# Patient Record
Sex: FEMALE | Race: BLACK OR AFRICAN AMERICAN | Employment: FULL TIME | URBAN - METROPOLITAN AREA
[De-identification: names, ages, dates, MRNs, and addresses within clinical notes are randomized per-mention and may not be internally consistent; named-entity substitution may affect disease eponyms.]

---

## 2018-01-26 ENCOUNTER — HOSPITAL ENCOUNTER (EMERGENCY)
Age: 53
Discharge: HOME | End: 2018-01-26
Attending: FAMILY MEDICINE

## 2018-01-26 VITALS
RESPIRATION RATE: 16 BRPM | HEART RATE: 85 BPM | HEIGHT: 72 IN | TEMPERATURE: 98.5 F | WEIGHT: 254 LBS | SYSTOLIC BLOOD PRESSURE: 186 MMHG | DIASTOLIC BLOOD PRESSURE: 99 MMHG | OXYGEN SATURATION: 96 % | BODY MASS INDEX: 34.4 KG/M2

## 2018-01-26 DIAGNOSIS — J10.1 INFLUENZA A: Primary | ICD-10-CM

## 2018-01-26 LAB
INFLUENZA A AG (POC): POSITIVE
INFLUENZA AG (POC) NEGATIVE CTRL.: ABNORMAL
INFLUENZA AG (POC) POSITIVE CTRL.: ABNORMAL
INFLUENZA B AG (POC): NEGATIVE
LOT NUMBER POC: ABNORMAL

## 2018-01-26 RX ORDER — BENZONATATE 200 MG/1
200 CAPSULE ORAL
Qty: 30 CAP | Refills: 0 | Status: SHIPPED | OUTPATIENT
Start: 2018-01-26 | End: 2018-04-06

## 2018-01-26 RX ORDER — OSELTAMIVIR PHOSPHATE 75 MG/1
75 CAPSULE ORAL 2 TIMES DAILY
Qty: 10 CAP | Refills: 0 | Status: SHIPPED | OUTPATIENT
Start: 2018-01-26 | End: 2018-01-31

## 2018-01-26 NOTE — UC PROVIDER NOTE
Patient is a 46 y.o. female presenting with cold symptoms. The history is provided by the patient. Cold Symptoms    This is a new problem. The current episode started 2 days ago. The problem has been gradually worsening. Patient reports a subjective fever - was not measured. The fever has been present for less than 1 day. Associated symptoms include congestion, headaches, rhinorrhea, sinus pain, sneezing, swollen glands and cough. Pertinent negatives include no chest pain, no diarrhea, no nausea, no vomiting, no sore throat and no wheezing. She has tried increased fluids for the symptoms. The treatment provided no relief. Past Medical History:   Diagnosis Date    Follicular adenoma of thyroid gland 12/5/2012    Hypertension     Migraine         Past Surgical History:   Procedure Laterality Date    HX GYN      hysterectomy    HX TUMOR REMOVAL           Family History   Problem Relation Age of Onset    Family history unknown: Yes        Social History     Social History    Marital status: SINGLE     Spouse name: N/A    Number of children: N/A    Years of education: N/A     Occupational History    Not on file. Social History Main Topics    Smoking status: Current Every Day Smoker     Packs/day: 0.25     Types: Cigarettes    Smokeless tobacco: Never Used    Alcohol use No    Drug use: No    Sexual activity: No     Other Topics Concern    Not on file     Social History Narrative                ALLERGIES: Latex    Review of Systems   Constitutional: Positive for chills and fever. HENT: Positive for congestion, rhinorrhea, sinus pain and sneezing. Negative for sore throat. Respiratory: Positive for cough. Negative for shortness of breath and wheezing. Cardiovascular: Negative for chest pain and palpitations. Gastrointestinal: Negative for diarrhea, nausea and vomiting. Neurological: Positive for headaches.        Vitals:    01/26/18 1754   BP: (!) 186/99   Pulse: 85   Resp: 16   Temp: 98.5 °F (36.9 °C)   SpO2: 96%   Weight: 115.2 kg (254 lb)   Height: 6' 1\" (1.854 m)       Physical Exam   Constitutional: She appears well-developed and well-nourished. No distress. HENT:   Right Ear: Tympanic membrane, external ear and ear canal normal.   Left Ear: Tympanic membrane, external ear and ear canal normal.   Nose: Rhinorrhea present. Mouth/Throat: Mucous membranes are normal. Posterior oropharyngeal erythema present. No oropharyngeal exudate, posterior oropharyngeal edema or tonsillar abscesses. Cardiovascular: Normal rate, regular rhythm and normal heart sounds. Pulmonary/Chest: Effort normal and breath sounds normal. No respiratory distress. She has no wheezes. She has no rales. Lymphadenopathy:     She has cervical adenopathy. Neurological: She is alert. Skin: She is not diaphoretic. Psychiatric: She has a normal mood and affect. Her behavior is normal. Judgment and thought content normal.   Nursing note and vitals reviewed. MDM     Differential Diagnosis; Clinical Impression; Plan:     CLINICAL IMPRESSION:  Influenza A  (primary encounter diagnosis)    Plan:  1. Tamiflu  2. Fluids, tylenol  3. RTC INI  Amount and/or Complexity of Data Reviewed:   Clinical lab tests:  Ordered and reviewed  Risk of Significant Complications, Morbidity, and/or Mortality:   Presenting problems: Moderate  Diagnostic procedures: Moderate  Management options:   Moderate  Progress:   Patient progress:  Stable      Procedures

## 2018-01-26 NOTE — DISCHARGE INSTRUCTIONS

## 2018-01-26 NOTE — LETTER
[facility] 
[address] [place] [address] 
[phone] Work/School Note Date: 1/26/2018 To Whom It May concern: 
 
Nahed Lam was seen and treated today in the urgent care center by the following provider(s): 
Attending Provider: Ophelia Sinclair MD.   
 
Nahed Lam may return to work on 1/29/2018. Sincerely, Ophelia Sinclair MD

## 2018-04-06 ENCOUNTER — OFFICE VISIT (OUTPATIENT)
Dept: URGENT CARE | Age: 53
End: 2018-04-06

## 2018-04-06 VITALS
BODY MASS INDEX: 34.4 KG/M2 | HEIGHT: 72 IN | TEMPERATURE: 97.9 F | OXYGEN SATURATION: 99 % | HEART RATE: 82 BPM | DIASTOLIC BLOOD PRESSURE: 109 MMHG | RESPIRATION RATE: 16 BRPM | WEIGHT: 254 LBS | SYSTOLIC BLOOD PRESSURE: 209 MMHG

## 2018-04-06 DIAGNOSIS — S39.011A STRAIN OF FLANK, INITIAL ENCOUNTER: Primary | ICD-10-CM

## 2018-04-06 DIAGNOSIS — M62.838 MUSCLE SPASM: ICD-10-CM

## 2018-04-06 DIAGNOSIS — R03.0 ELEVATED BLOOD PRESSURE READING: ICD-10-CM

## 2018-04-06 RX ORDER — METHOCARBAMOL 500 MG/1
500 TABLET, FILM COATED ORAL 3 TIMES DAILY
Qty: 12 TAB | Refills: 0 | Status: SHIPPED | OUTPATIENT
Start: 2018-04-06 | End: 2018-04-10 | Stop reason: SINTOL

## 2018-04-06 NOTE — PATIENT INSTRUCTIONS
Follow up with PCP in 7 days if you dont feel improved. You should follow up within the next 2-3 weeks to discuss BP. Monitor, write down values and bring to appointment. Use heat compresses to area. Maintain normal activities. Avoid strenuous bending, lifting or twisting for next 4-5 days. Back Spasm: Care Instructions  Your Care Instructions  A back spasm is sudden tightness and pain in your back muscles. It may happen from overuse or an injury. Things like sleeping in an awkward way, bending, lifting, standing, or sitting can sometimes cause a spasm. But the cause isn't always clear. Home treatment includes using heat or ice, taking over-the-counter (OTC) pain medicines, and avoiding activities that may cause back pain. For a back spasm that doesn't get better with home care, your doctor may prescribe medicine. Treatments such as massage or manipulation may also help ease a back spasm. Your doctor may also suggest exercise or physical therapy to help improve strength and flexibility in your back muscles. In most cases, getting back to your normal activities is good foryour back. Just make sure to avoid doing things that make your pain worse. Follow-up care is a key part of your treatment and safety. Be sure to make and go to all appointments, and call your doctor if you are having problems. It's also a good idea to know your test results and keep a list of the medicines you take. How can you care for yourself at home? ? Heat, ice, and medicines  ? · To relieve pain, use heat or ice (whichever feels better) on the affected area. ¨ Put a warm water bottle, a heating pad set on low, or a warm cloth on your back. Put a thin cloth between the heating pad and your skin. Do not go to sleep with a heating pad on your skin. ¨ Try ice or a cold pack on the area for 10 to 20 minutes at a time. Put a thin cloth between the ice and your skin.    ? · Ask your doctor if you can take acetaminophen (such as Tylenol) or nonsteroidal anti-inflammatory drugs, such as ibuprofen or naproxen. Your doctor can prescribe stronger medicines if needed. Be safe with medicines. Read and follow all instructions on the label. ?Body positions and posture  ? · Sit or lie in positions that are most comfortable for you and that reduce pain. Try one of these positions when you lie down:  ¨ Lie on your back with your knees bent and supported by large pillows. ¨ Lie on the floor with your legs on the seat of a sofa or chair. Evi [place] on your side with your knees and hips bent and a pillow between your legs. ¨ Lie on your stomach if it does not make pain worse. ? · Do not sit up in bed. Avoid soft couches and twisted positions. ? · Avoid bed rest after the first day of back pain. Bed rest can help relieve pain at first, but it delays healing. Continued rest without activity is usually not good for your back. ? · If you must sit for long periods of time, take breaks from sitting. Change positions every 30 minutes. Get up and walk around, or lie in a comfortable position. Activity  ? · Take short walks several times a day. You can start with 5 to 10 minutes, 3 or 4 times a day, and work up to longer walks. Walk on level surfaces and avoid hills and stairs until your back starts to feel better. ? · After your back spasm starts to feel better, try to stretch your muscles every day, especially before and after exercise and at bedtime. Regular stretching can help relax your muscles. ? · To prevent future back pain, do exercises to stretch and strengthen your back and stomach. Learn to use good posture, safe lifting techniques, and other ways to move to help you avoid back pain. When should you call for help? Call 911 anytime you think you may need emergency care. For example, call if:  ? · You are unable to move an arm or a leg at all.    ?Call your doctor now or seek immediate medical care if:  ? · You have new or worse symptoms in your legs, belly, or buttocks. Symptoms may include:  ¨ Numbness or tingling. ¨ Weakness. ¨ Pain. ? · You lose bladder or bowel control. ? Watch closely for changes in your health, and be sure to contact your doctor if:  ? · You do not get better as expected. Where can you learn more? Go to http://rubi-ghassan.info/. Enter E232 in the search box to learn more about \"Back Spasm: Care Instructions. \"  Current as of: March 21, 2017  Content Version: 11.4  © 5793-9382 [employer]. Care instructions adapted under license by [employer] (which disclaims liability or warranty for this information). If you have questions about a medical condition or this instruction, always ask your healthcare professional. [address] any warranty or liability for your use of this information.

## 2018-04-06 NOTE — MR AVS SNAPSHOT
[address] [facility] [address] 
[phone] Patient: Coco Espino MRN: [id number] Visit Information Date & Time Provider Department Dept. Phone Encounter #  
 4/6/2018  2:00 PM [address] [facility] [phone] [id number] Upcoming Health Maintenance Date Due Hepatitis C Screening 1965 COLONOSCOPY 5/2/1983 Pneumococcal 19-64 Medium Risk (1 of 1 - PPSV23) 5/2/1984 DTaP/Tdap/Td series (1 - Tdap) 5/2/1986 PAP AKA CERVICAL CYTOLOGY 5/2/1986 BREAST CANCER SCRN MAMMOGRAM 5/2/2015 Influenza Age 5 to Adult 8/1/2017 Allergies as of 4/6/2018  Review Complete On: 4/6/2018 By: Rosalia Chavez RN Severity Noted Reaction Type Reactions Latex  12/15/2010    Other (comments) Tape peels skin Current Immunizations  Never Reviewed No immunizations on file. Not reviewed this visit You Were Diagnosed With   
  
 Codes Comments Strain of flank, initial encounter    -  Primary ICD-10-CM: F90.764W ICD-9-CM: 497. 8 Muscle spasm     ICD-10-CM: Q07.789 ICD-9-CM: 728.85 Vitals BP Pulse Temp Resp Height(growth percentile) Weight(growth percentile) (!) 209/109 82 97.9 °F (36.6 °C) 16 6' 1\" (1.854 m) 254 lb (115.2 kg) SpO2 BMI OB Status Smoking Status 99% 33.51 kg/m2 Hysterectomy Current Every Day Smoker Vitals History BMI and BSA Data Body Mass Index Body Surface Area  
 33.51 kg/m 2 2.44 m 2 Preferred Pharmacy Pharmacy Name Phone [facility] [phone] [place] [address] [phone] Your Updated Medication List  
  
   
This list is accurate as of 4/6/18  2:54 PM.  Always use your most recent med list.  
  
  
  
  
 albuterol 90 mcg/actuation inhaler Commonly known as:  PROVENTIL HFA, VENTOLIN HFA, PROAIR HFA  
 Take 2 Puffs by inhalation every six (6) hours as needed for Wheezing. amLODIPine 10 mg tablet Commonly known as:  Amber Pall TAKE 1 TABLET BY MOUTH EVERY DAY  
  
 fluticasone 50 mcg/actuation nasal spray Commonly known as:  Genie Germain 2 Sprays by Both Nostrils route daily. methocarbamol 500 mg tablet Commonly known as:  ROBAXIN Take 1 Tab by mouth three (3) times daily for 4 days. potassium chloride 20 mEq tablet Commonly known as:  K-DUR, KLOR-CON  
TAKE 1 TABLET BY MOUTH TWICE DAILY  
  
 triamterene-hydroCHLOROthiazide 37.5-25 mg per tablet Commonly known as:  Korene Adrian Take 1 Tab by mouth daily. Prescriptions Sent to Pharmacy Refills  
 methocarbamol (ROBAXIN) 500 mg tablet 0 Sig: Take 1 Tab by mouth three (3) times daily for 4 days. Class: Normal  
 Pharmacy: [facility] [address] #: [phone] Route: Oral  
  
Patient Instructions Follow up with PCP in 7 days if you dont feel improved. You should follow up within the next 2-3 weeks to discuss BP. Monitor, write down values and bring to appointment. Use heat compresses to area. Maintain normal activities. Avoid strenuous bending, lifting or twisting for next 4-5 days. Back Spasm: Care Instructions Your Care Instructions A back spasm is sudden tightness and pain in your back muscles. It may happen from overuse or an injury. Things like sleeping in an awkward way, bending, lifting, standing, or sitting can sometimes cause a spasm. But the cause isn't always clear. Home treatment includes using heat or ice, taking over-the-counter (OTC) pain medicines, and avoiding activities that may cause back pain. For a back spasm that doesn't get better with home care, your doctor may prescribe medicine. Treatments such as massage or manipulation may also help ease a back spasm.  Your doctor may also suggest exercise or physical therapy to help improve strength and flexibility in your back muscles. In most cases, getting back to your normal activities is good foryour back. Just make sure to avoid doing things that make your pain worse. Follow-up care is a key part of your treatment and safety. Be sure to make and go to all appointments, and call your doctor if you are having problems. It's also a good idea to know your test results and keep a list of the medicines you take. How can you care for yourself at home? ? Heat, ice, and medicines ? · To relieve pain, use heat or ice (whichever feels better) on the affected area. ¨ Put a warm water bottle, a heating pad set on low, or a warm cloth on your back. Put a thin cloth between the heating pad and your skin. Do not go to sleep with a heating pad on your skin. ¨ Try ice or a cold pack on the area for 10 to 20 minutes at a time. Put a thin cloth between the ice and your skin. ? · Ask your doctor if you can take acetaminophen (such as Tylenol) or nonsteroidal anti-inflammatory drugs, such as ibuprofen or naproxen. Your doctor can prescribe stronger medicines if needed. Be safe with medicines. Read and follow all instructions on the label. ?Body positions and posture ? · Sit or lie in positions that are most comfortable for you and that reduce pain. Try one of these positions when you lie down: ¨ Lie on your back with your knees bent and supported by large pillows. ¨ Lie on the floor with your legs on the seat of a sofa or chair. Marsha Husk on your side with your knees and hips bent and a pillow between your legs. ¨ Lie on your stomach if it does not make pain worse. ? · Do not sit up in bed. Avoid soft couches and twisted positions. ? · Avoid bed rest after the first day of back pain. Bed rest can help relieve pain at first, but it delays healing. Continued rest without activity is usually not good for your back. ? · If you must sit for long periods of time, take breaks from sitting. Change positions every 30 minutes. Get up and walk around, or lie in a comfortable position. Activity ? · Take short walks several times a day. You can start with 5 to 10 minutes, 3 or 4 times a day, and work up to longer walks. Walk on level surfaces and avoid hills and stairs until your back starts to feel better. ? · After your back spasm starts to feel better, try to stretch your muscles every day, especially before and after exercise and at bedtime. Regular stretching can help relax your muscles. ? · To prevent future back pain, do exercises to stretch and strengthen your back and stomach. Learn to use good posture, safe lifting techniques, and other ways to move to help you avoid back pain. When should you call for help? Call 911 anytime you think you may need emergency care. For example, call if: 
? · You are unable to move an arm or a leg at all. ?Call your doctor now or seek immediate medical care if: 
? · You have new or worse symptoms in your legs, belly, or buttocks. Symptoms may include: ¨ Numbness or tingling. ¨ Weakness. ¨ Pain. ? · You lose bladder or bowel control. ? Watch closely for changes in your health, and be sure to contact your doctor if: 
? · You do not get better as expected. Where can you learn more? Go to http://rubi-ghassan.info/. Enter E232 in the search box to learn more about \"Back Spasm: Care Instructions. \" Current as of: March 21, 2017 Content Version: 11.4 © 3728-7237 [employer]. Care instructions adapted under license by [employer] (which disclaims liability or warranty for this information). If you have questions about a medical condition or this instruction, always ask your healthcare professional. [place] [address] any warranty or liability for your use of this information. Introducing [facility] SERVICES! Dear Colleen Grijalva: Thank you for requesting a [employer] account. Our records indicate that you already have an active [employer] account. You can access your account anytime at [email or web address] Did you know that you can access your hospital and ER discharge instructions at any time in [employer]? You can also review all of your test results from your hospital stay or ER visit. Additional Information If you have questions, please visit the Frequently Asked Questions section of the [employer] website at [email or web address] Remember, [employer] is NOT to be used for urgent needs. For medical emergencies, dial 911. Now available from your iPhone and Android! Please provide this summary of care documentation to your next provider. Your primary care clinician is listed as [facility] Jose J. If you have any questions after today's visit, please call [phone].
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
## 2018-04-10 ENCOUNTER — OFFICE VISIT (OUTPATIENT)
Dept: INTERNAL MEDICINE CLINIC | Age: 53
End: 2018-04-10

## 2018-04-10 VITALS
HEIGHT: 72 IN | DIASTOLIC BLOOD PRESSURE: 100 MMHG | RESPIRATION RATE: 19 BRPM | OXYGEN SATURATION: 98 % | WEIGHT: 255 LBS | HEART RATE: 81 BPM | BODY MASS INDEX: 34.54 KG/M2 | SYSTOLIC BLOOD PRESSURE: 180 MMHG

## 2018-04-10 DIAGNOSIS — E78.00 HYPERCHOLESTEROLEMIA: ICD-10-CM

## 2018-04-10 DIAGNOSIS — F17.200 TOBACCO DEPENDENCE: ICD-10-CM

## 2018-04-10 DIAGNOSIS — Z86.011 H/O MENINGIOMA OF THE BRAIN: ICD-10-CM

## 2018-04-10 DIAGNOSIS — E66.9 CLASS 1 OBESITY WITH SERIOUS COMORBIDITY AND BODY MASS INDEX (BMI) OF 33.0 TO 33.9 IN ADULT, UNSPECIFIED OBESITY TYPE: ICD-10-CM

## 2018-04-10 DIAGNOSIS — I10 ESSENTIAL HYPERTENSION: Primary | ICD-10-CM

## 2018-04-10 RX ORDER — TIZANIDINE 4 MG/1
4 TABLET ORAL
Qty: 30 TAB | Refills: 0 | Status: SHIPPED | OUTPATIENT
Start: 2018-04-10 | End: 2020-05-12 | Stop reason: ALTCHOICE

## 2018-04-10 RX ORDER — FLUTICASONE PROPIONATE 50 MCG
2 SPRAY, SUSPENSION (ML) NASAL DAILY
Qty: 1 BOTTLE | Refills: 0 | Status: SHIPPED | OUTPATIENT
Start: 2018-04-10 | End: 2018-05-15 | Stop reason: CLARIF

## 2018-04-10 RX ORDER — POTASSIUM CHLORIDE 20 MEQ/1
TABLET, EXTENDED RELEASE ORAL
Qty: 60 TAB | Refills: 5 | Status: SHIPPED | OUTPATIENT
Start: 2018-04-10 | End: 2020-05-12 | Stop reason: SDUPTHER

## 2018-04-10 RX ORDER — TRIAMTERENE/HYDROCHLOROTHIAZID 37.5-25 MG
1 TABLET ORAL DAILY
Qty: 30 TAB | Refills: 5 | Status: SHIPPED | OUTPATIENT
Start: 2018-04-10 | End: 2018-11-15 | Stop reason: SDUPTHER

## 2018-04-10 RX ORDER — AMLODIPINE BESYLATE 10 MG/1
TABLET ORAL
Qty: 30 TAB | Refills: 5 | Status: SHIPPED | OUTPATIENT
Start: 2018-04-10 | End: 2018-11-15 | Stop reason: SDUPTHER

## 2018-04-10 NOTE — PROGRESS NOTES
Health Maintenance Due   Topic Date Due    Hepatitis C Screening  1965    COLONOSCOPY  05/02/1983    Pneumococcal 19-64 Medium Risk (1 of 1 - PPSV23) 05/02/1984    DTaP/Tdap/Td series (1 - Tdap) 05/02/1986    PAP AKA CERVICAL CYTOLOGY  05/02/1986    BREAST CANCER SCRN MAMMOGRAM  05/02/2015       Chief Complaint   Patient presents with    LOW BACK PAIN    Hypertension    Cholesterol Problem    Follow Up Chronic Condition       1. Have you been to the ER, urgent care clinic since your last visit? Hospitalized since your last visit? No    2. Have you seen or consulted any other health care providers outside of the [address] [place] [address] since your last visit? Include any pap smears or colon screening. No    3) Do you have an Advance Directive on file? no    4) Are you interested in receiving information on Advance Directives? NO      Patient is accompanied by self I have received verbal consent from Uma Tyler to discuss any/all medical information while they are present in the room.

## 2018-04-10 NOTE — PROGRESS NOTES
HISTORY OF PRESENT ILLNESS  Anders Hamilton is a 46 y.o. female. Pt. comes in for f/u. Has multiple medical problems. Reports having some left-sided lower back pain. While sitting in a restaurant car hit the wall from outside. She went to ER. All workup was negative. Some medications medications which helps some. Overall is feeling better. BP is high today. No related symptoms. Reports compliance with medications and diet. Med list and most recent labs/studies reviewed with pt. Trying to be active physically to control weight. Needs med refills. Due for repeat labs. Reports no other new c/o. LOW BACK PAIN   Pertinent negatives include no chest pain, no abdominal pain, no headaches and no shortness of breath. Hypertension    Pertinent negatives include no chest pain, no blurred vision, no headaches, no dizziness and no shortness of breath. Cholesterol Problem   Pertinent negatives include no chest pain, no abdominal pain, no headaches and no shortness of breath. Follow Up Chronic Condition   Pertinent negatives include no chest pain, no abdominal pain, no headaches and no shortness of breath. Review of Systems   Constitutional: Negative. HENT: Negative. Eyes: Negative for blurred vision. Respiratory: Negative for shortness of breath. Cardiovascular: Negative for chest pain and leg swelling. Gastrointestinal: Negative for abdominal pain. Genitourinary: Negative for dysuria. Musculoskeletal: Positive for back pain and joint pain. Negative for falls. Skin: Negative. Neurological: Negative for dizziness, sensory change, focal weakness and headaches. Psychiatric/Behavioral: Negative for depression. The patient is not nervous/anxious and does not have insomnia. All other systems reviewed and are negative. Physical Exam   Constitutional: She is oriented to person, place, and time. She appears well-developed and well-nourished. No distress.    obese   HENT:   Head: Normocephalic and atraumatic. Mouth/Throat: Oropharynx is clear and moist.   Eyes: Conjunctivae are normal.   Neck: Normal range of motion. Neck supple. No thyromegaly present. Cardiovascular: Normal rate, regular rhythm, normal heart sounds and intact distal pulses. No murmur heard. Pulmonary/Chest: Effort normal and breath sounds normal. No respiratory distress. She has no wheezes. She has no rales. Abdominal: Soft. Bowel sounds are normal. She exhibits no distension. Musculoskeletal: She exhibits tenderness (L lower thoracics/side, no bruise/rash). She exhibits no edema. R shoulder lipoma   Neurological: She is alert and oriented to person, place, and time. Coordination normal.   Skin: Skin is warm and dry. No rash noted. Psychiatric: She has a normal mood and affect. Her behavior is normal.   Nursing note and vitals reviewed. ASSESSMENT and PLAN  Diagnoses and all orders for this visit:    1. Essential hypertension  -     potassium chloride (K-DUR, KLOR-CON) 20 mEq tablet; TAKE 1 TABLET BY MOUTH TWICE DAILY  -     amLODIPine (NORVASC) 10 mg tablet; TAKE 1 TABLET BY MOUTH EVERY DAY  -     LIPID PANEL  -     METABOLIC PANEL, COMPREHENSIVE  -     CBC W/O DIFF    2. Class 1 obesity with serious comorbidity and body mass index (BMI) of 33.0 to 33.9 in adult, unspecified obesity type    3. Tobacco dependence    4. Hypercholesterolemia  -     LIPID PANEL    5. H/O meningioma of the brain    Other orders  -     triamterene-hydroCHLOROthiazide (MAXZIDE) 37.5-25 mg per tablet; Take 1 Tab by mouth daily. -     fluticasone (FLONASE) 50 mcg/actuation nasal spray; 2 Sprays by Both Nostrils route daily. -     tiZANidine (ZANAFLEX) 4 mg tablet; Take 1 Tab by mouth three (3) times daily as needed. Follow-up Disposition:  Return in about 2 weeks (around 4/24/2018).    lab results and schedule of future lab studies reviewed with patient  reviewed diet, exercise and weight control  reviewed medications and side effects in detail

## 2018-04-10 NOTE — MR AVS SNAPSHOT
[address] [facility] 102 [address] [place] [address] 
[phone] Patient: Eleuterio Chauhan MRN: [id number] HGZ:[birth year] Visit Information Date & Time Provider Department Dept. Phone Encounter #  
 4/10/2018  1:00 PM Austin Mays, [address] [facility] Internal Medicine [phone] [id number] Upcoming Health Maintenance Date Due Hepatitis C Screening 1965 COLONOSCOPY 5/2/1983 Pneumococcal 19-64 Medium Risk (1 of 1 - PPSV23) 5/2/1984 DTaP/Tdap/Td series (1 - Tdap) 5/2/1986 PAP AKA CERVICAL CYTOLOGY 5/2/1986 BREAST CANCER SCRN MAMMOGRAM 5/2/2015 Allergies as of 4/10/2018  Review Complete On: 4/10/2018 By: Austin Mays DO Severity Noted Reaction Type Reactions Latex  12/15/2010    Other (comments) Tape peels skin Current Immunizations  Never Reviewed No immunizations on file. Not reviewed this visit You Were Diagnosed With   
  
 Codes Comments Essential hypertension    -  Primary ICD-10-CM: I10 
ICD-9-CM: 401.9 Class 1 obesity with serious comorbidity and body mass index (BMI) of 33.0 to 33.9 in adult, unspecified obesity type     ICD-10-CM: E66.9, Z68.33 
ICD-9-CM: 278.00, V85.33 Tobacco dependence     ICD-10-CM: H25.498 ICD-9-CM: 305.1 Hypercholesterolemia     ICD-10-CM: E78.00 ICD-9-CM: 272.0 H/O meningioma of the brain     ICD-10-CM: Z86.011 
ICD-9-CM: V12.41 Vitals BP Pulse Resp Height(growth percentile) Weight(growth percentile) SpO2  
 (!) 180/100 (BP 1 Location: Left arm, BP Patient Position: Sitting) 81 19 6' 1\" (1.854 m) 255 lb (115.7 kg) 98% BMI OB Status Smoking Status 33.64 kg/m2 Hysterectomy Current Every Day Smoker Vitals History BMI and BSA Data Body Mass Index Body Surface Area  
 33.64 kg/m 2 2.44 m 2 Preferred Pharmacy Pharmacy Name Phone  [phone] [facility]. [address] [phone] Your Updated Medication List  
  
   
This list is accurate as of 4/10/18  1:26 PM.  Always use your most recent med list.  
  
  
  
  
 albuterol 90 mcg/actuation inhaler Commonly known as:  PROVENTIL HFA, VENTOLIN HFA, PROAIR HFA Take 2 Puffs by inhalation every six (6) hours as needed for Wheezing. amLODIPine 10 mg tablet Commonly known as:  Erenest Frances TAKE 1 TABLET BY MOUTH EVERY DAY  
  
 fluticasone 50 mcg/actuation nasal spray Commonly known as:  [place] Galla 2 Sprays by Both Nostrils route daily. potassium chloride 20 mEq tablet Commonly known as:  K-DUR, KLOR-CON  
TAKE 1 TABLET BY MOUTH TWICE DAILY  
  
 tiZANidine 4 mg tablet Commonly known as:  Berry Player Take 1 Tab by mouth three (3) times daily as needed. triamterene-hydroCHLOROthiazide 37.5-25 mg per tablet Commonly known as:  Ila Reveal Take 1 Tab by mouth daily. Prescriptions Sent to Pharmacy Refills  
 potassium chloride (K-DUR, KLOR-CON) 20 mEq tablet 5 Sig: TAKE 1 TABLET BY MOUTH TWICE DAILY Class: Normal  
 Pharmacy: [facility] [address] Ph #: [phone]  
 triamterene-hydroCHLOROthiazide (MAXZIDE) 37.5-25 mg per tablet 5 Sig: Take 1 Tab by mouth daily. Class: Normal  
 Pharmacy: [facility] [address] Ph #: [phone] Route: Oral  
 amLODIPine (NORVASC) 10 mg tablet 5 Sig: TAKE 1 TABLET BY MOUTH EVERY DAY Class: Normal  
 Pharmacy: [facility] [address] Ph #: [phone]  
 fluticasone (FLONASE) 50 mcg/actuation nasal spray 0 Si Sprays by Both Nostrils route daily. Class: Normal  
 Pharmacy: [facility] [address] Ph #: [phone] Route: Both Nostrils  
 tiZANidine (ZANAFLEX) 4 mg tablet 0 Sig: Take 1 Tab by mouth three (3) times daily as needed. Class: Normal  
 Pharmacy: [facility] [address] [place] [address] #: [phone] Route: Oral  
  
We Performed the Following CBC W/O DIFF [97831 CPT(R)] LIPID PANEL [88255 CPT(R)] METABOLIC PANEL, COMPREHENSIVE [90671 CPT(R)] Introducing [facility] SERVICES! Dear Tyson Perdue: Thank you for requesting a Abzena account. Our records indicate that you already have an active Abzena account. You can access your account anytime at [email or web address] Did you know that you can access your hospital and ER discharge instructions at any time in Abzena? You can also review all of your test results from your hospital stay or ER visit. Additional Information If you have questions, please visit the Frequently Asked Questions section of the Abzena website at [email or web address] Remember, Abzena is NOT to be used for urgent needs. For medical emergencies, dial 911. Now available from your iPhone and Android! Please provide this summary of care documentation to your next provider. Your primary care clinician is listed as Jyothi Banegas. If you have any questions after today's visit, please call [phone].

## 2018-05-15 ENCOUNTER — TELEPHONE (OUTPATIENT)
Dept: INTERNAL MEDICINE CLINIC | Age: 53
End: 2018-05-15

## 2018-05-15 RX ORDER — AZELASTINE 1 MG/ML
1 SPRAY, METERED NASAL 2 TIMES DAILY
Qty: 1 BOTTLE | Refills: 0 | OUTPATIENT
Start: 2018-05-15 | End: 2022-07-21 | Stop reason: SDUPTHER

## 2018-05-15 NOTE — TELEPHONE ENCOUNTER
Called pharmacy as fluticasone isn't covered by her insurance. Nasonex, and nasocort were also not covered. Azelastine was covered but was still 29.76. We discussed her telling the patient that flonase OTC or other meds might be cheaper than that.   rx was switched to azelastine per order from provider

## 2018-08-07 ENCOUNTER — OFFICE VISIT (OUTPATIENT)
Dept: URGENT CARE | Age: 53
End: 2018-08-07

## 2018-08-07 VITALS
RESPIRATION RATE: 18 BRPM | WEIGHT: 240 LBS | BODY MASS INDEX: 32.51 KG/M2 | SYSTOLIC BLOOD PRESSURE: 145 MMHG | TEMPERATURE: 97.3 F | DIASTOLIC BLOOD PRESSURE: 90 MMHG | HEIGHT: 72 IN | OXYGEN SATURATION: 99 % | HEART RATE: 98 BPM

## 2018-08-07 DIAGNOSIS — L04.9 ACUTE LYMPHADENITIS: Primary | ICD-10-CM

## 2018-08-07 RX ORDER — AMOXICILLIN AND CLAVULANATE POTASSIUM 875; 125 MG/1; MG/1
1 TABLET, FILM COATED ORAL 2 TIMES DAILY
Qty: 20 TAB | Refills: 0 | Status: SHIPPED | OUTPATIENT
Start: 2018-08-07 | End: 2018-08-17

## 2018-08-07 NOTE — PROGRESS NOTES
Patient is a 48 y.o. female presenting with neck pain. Neck Pain    The history is provided by the patient. This is a new problem. Episode onset: this AM. The problem occurs constantly. The problem has not changed since onset. There has been no fever. Pain location: right anterior. The pain is moderate. Pertinent negatives include no chest pain, no numbness, no headaches, no tingling and no weakness. She has tried nothing for the symptoms. 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 Social History Main Topics    Smoking status: Current Every Day Smoker     Packs/day: 0.25     Types: Cigarettes    Smokeless tobacco: Never Used    Alcohol use No    Drug use: No    Sexual activity: No     Other Topics Concern    Not on file     Social History Narrative                ALLERGIES: Latex    Review of Systems   Constitutional: Negative for activity change, appetite change, chills and fever. HENT: Negative for congestion, ear pain, rhinorrhea, sinus pain, sinus pressure, sore throat and trouble swallowing. Respiratory: Negative for cough, shortness of breath and wheezing. Cardiovascular: Negative for chest pain and palpitations. Gastrointestinal: Negative for nausea and vomiting. Musculoskeletal: Positive for neck pain. Negative for myalgias. Neurological: Negative for dizziness, tingling, weakness, numbness and headaches. Hematological: Positive for adenopathy.        Vitals:    08/07/18 1121   BP: 145/90   Pulse: 98   Resp: 18   Temp: 97.3 °F (36.3 °C)   SpO2: 99%   Weight: 240 lb (108.9 kg)   Height: 6' 1\" (1.854 m)       Physical Exam   Constitutional: She appears well-developed and well-nourished. No distress. HENT:   Right Ear: Tympanic membrane, external ear and ear canal normal.   Left Ear: Tympanic membrane, external ear and ear canal normal.   Nose: Nose normal. Right sinus exhibits no maxillary sinus tenderness and no frontal sinus tenderness. Left sinus exhibits no maxillary sinus tenderness and no frontal sinus tenderness. Mouth/Throat: Oropharynx is clear and moist and mucous membranes are normal. No oropharyngeal exudate, posterior oropharyngeal edema, posterior oropharyngeal erythema or tonsillar abscesses. Cardiovascular: Normal rate, regular rhythm and normal heart sounds. Pulmonary/Chest: Effort normal and breath sounds normal. No respiratory distress. She has no wheezes. She has no rales. Lymphadenopathy:     She has cervical adenopathy (right - tender 1x1cm). Neurological: She is alert. Skin: She is not diaphoretic. Psychiatric: She has a normal mood and affect. Her behavior is normal. Judgment and thought content normal.   Nursing note and vitals reviewed. MDM    ICD-10-CM ICD-9-CM    1. Acute lymphadenitis L04.9 683      Medications Ordered Today   Medications    amoxicillin-clavulanate (AUGMENTIN) 875-125 mg per tablet     Sig: Take 1 Tab by mouth two (2) times a day for 10 days. Dispense:  20 Tab     Refill:  0     The patients condition was discussed with the patient and they understand. The patient is to follow up with PCP [facility]. If signs and symptoms become worse the pt is to go to the ER. The patient is to take medications as prescribed.              Procedures

## 2018-08-07 NOTE — MR AVS SNAPSHOT
[address] [place] [address] 
[phone] Patient: Hunter Andrews MRN: [id number] IHE:[birth year] Visit Information Date & Time Provider Department Dept. Phone Encounter #  
 8/7/2018 11:15 AM [facility] [phone] [id number] Upcoming Health Maintenance Date Due Hepatitis C Screening 1965 COLONOSCOPY 5/2/1983 Pneumococcal 19-64 Medium Risk (1 of 1 - PPSV23) 5/2/1984 DTaP/Tdap/Td series (1 - Tdap) 5/2/1986 PAP AKA CERVICAL CYTOLOGY 5/2/1986 BREAST CANCER SCRN MAMMOGRAM 5/2/2015 Influenza Age 5 to Adult 8/1/2018 Allergies as of 8/7/2018  Review Complete On: 8/7/2018 By: Marco A Parrish MD  
  
 Severity Noted Reaction Type Reactions Latex  12/15/2010    Other (comments) Tape peels skin Current Immunizations  Never Reviewed No immunizations on file. Not reviewed this visit You Were Diagnosed With   
  
 Codes Comments Acute lymphadenitis    -  Primary ICD-10-CM: L04.9 ICD-9-CM: 382 Vitals BP Pulse Temp Resp Height(growth percentile) Weight(growth percentile) 145/90 98 97.3 °F (36.3 °C) 18 6' 1\" (1.854 m) 240 lb (108.9 kg) SpO2 BMI OB Status Smoking Status 99% 31.66 kg/m2 Hysterectomy Current Every Day Smoker BMI and BSA Data Body Mass Index Body Surface Area  
 31.66 kg/m 2 2.37 m 2 Preferred Pharmacy Pharmacy Name Phone [facility] [phone] [address] [phone] Your Updated Medication List  
  
   
This list is accurate as of 8/7/18 11:30 AM.  Always use your most recent med list.  
  
  
  
  
 albuterol 90 mcg/actuation inhaler Commonly known as:  PROVENTIL HFA, VENTOLIN HFA, PROAIR HFA Take 2 Puffs by inhalation every six (6) hours as needed for Wheezing. amLODIPine 10 mg tablet Commonly known as:  Dmitri Block TAKE 1 TABLET BY MOUTH EVERY DAY  
  
 amoxicillin-clavulanate 875-125 mg per tablet Commonly known as:  AUGMENTIN Take 1 Tab by mouth two (2) times a day for 10 days. azelastine 137 mcg (0.1 %) nasal spray Commonly known as:  ASTELIN  
1 Spray by Both Nostrils route two (2) times a day. Use in each nostril as directed OTHER [facility] ( fax [phone]) evaluate and treat patient for DJD, back pain, and left knee pain  
  
 potassium chloride 20 mEq tablet Commonly known as:  K-DUR, KLOR-CON  
TAKE 1 TABLET BY MOUTH TWICE DAILY  
  
 tiZANidine 4 mg tablet Commonly known as:  Archana Sands Take 1 Tab by mouth three (3) times daily as needed. triamterene-hydroCHLOROthiazide 37.5-25 mg per tablet Commonly known as:  Ramonia Gelineau Take 1 Tab by mouth daily. Prescriptions Sent to Pharmacy Refills  
 amoxicillin-clavulanate (AUGMENTIN) 875-125 mg per tablet 0 Sig: Take 1 Tab by mouth two (2) times a day for 10 days. Class: Normal  
 Pharmacy: [facility] [address] #: [phone] Route: Oral  
  
Patient Instructions Lymphadenitis: Care Instructions Your Care Instructions Lymph nodes are small, bean-shaped glands throughout the body. They help the body fight germs and infections. Lymphadenitis is a swelling of a lymph node. It can be caused by an infection or other condition. The infection is most often in a nearby part of the body. A common example is the lumps on both sides of your neck under the jaw that get tender and bigger when you have a cold or sore throat. Sometimes the lymph node itself may be infected. Usually the swollen lymph nodes go back to normal size without a problem. Treatment, if needed, focuses on treating the cause. For example, a bacterial infection may be treated with antibiotics.  This should bring the node back to normal size. An infection caused by a virus often goes away on its own. In rare cases, a badly infected node may need to be drained by your doctor. Follow-up care is a key part of your treatment and safety. Be sure to make and go to all appointments, and call your doctor if you are having problems. It's also a good idea to know your test results and keep a list of the medicines you take. How can you care for yourself at home? · Be safe with medicines. ¨ If your doctor prescribed antibiotics, take them as directed. Do not stop taking them just because you feel better. You need to take the full course of antibiotics. ¨ Ask your doctor if you can take an over-the-counter pain medicine, such as acetaminophen (Tylenol), ibuprofen (Advil, Motrin), or naproxen (Aleve). Read and follow all instructions on the label. · If you have pain, try a warm compress. Soak a towel or washcloth in warm water. Wring it out, and place it on the affected skin. · Do not squeeze, drain, or puncture a painful lump. Doing this can irritate or inflame the lump, push any existing infection deeper into the skin, or cause severe bleeding. When should you call for help? Call your doctor now or seek immediate medical care if: 
  · Your lymph nodes get bigger.  
  · The area becomes red and feels more tender.  
  · You have a fever that does not go away.  
 Watch closely for changes in your health, and be sure to contact your doctor if: 
  · You do not get better as expected. Where can you learn more? Go to http://rubi-ghassan.info/. Enter M077 in the search box to learn more about \"Lymphadenitis: Care Instructions. \" Current as of: November 18, 2017 Content Version: 11.7 © 4896-8058 MeMeMe, Inc[employer]. Care instructions adapted under license by "[employer] (which disclaims liability or warranty for this information).  If you have questions about a medical condition or this instruction, always ask your healthcare professional. [address] any warranty or liability for your use of this information. Introducing [facility] & HEALTH SERVICES! Dear Dimitris Ernst: Thank you for requesting a Curis account. Our records indicate that you already have an active Curis account. You can access your account anytime at [email or web address] Inc Did you know that you can access your hospital and ER discharge instructions at any time in Curis? You can also review all of your test results from your hospital stay or ER visit. Additional Information If you have questions, please visit the Frequently Asked Questions section of the Curis website at [email or web address] "[email or web address]/. Remember, Curis is NOT to be used for urgent needs. For medical emergencies, dial 911. Now available from your iPhone and Android! Please provide this summary of care documentation to your next provider. Your primary care clinician is listed as Meena Felder. If you have any questions after today's visit, please call [phone].

## 2018-08-07 NOTE — PATIENT INSTRUCTIONS
Lymphadenitis: Care Instructions  Your Care Instructions  Lymph nodes are small, bean-shaped glands throughout the body. They help the body fight germs and infections. Lymphadenitis is a swelling of a lymph node. It can be caused by an infection or other condition. The infection is most often in a nearby part of the body. A common example is the lumps on both sides of your neck under the jaw that get tender and bigger when you have a cold or sore throat. Sometimes the lymph node itself may be infected. Usually the swollen lymph nodes go back to normal size without a problem. Treatment, if needed, focuses on treating the cause. For example, a bacterial infection may be treated with antibiotics. This should bring the node back to normal size. An infection caused by a virus often goes away on its own. In rare cases, a badly infected node may need to be drained by your doctor. Follow-up care is a key part of your treatment and safety. Be sure to make and go to all appointments, and call your doctor if you are having problems. It's also a good idea to know your test results and keep a list of the medicines you take. How can you care for yourself at home? · Be safe with medicines. ¨ If your doctor prescribed antibiotics, take them as directed. Do not stop taking them just because you feel better. You need to take the full course of antibiotics. ¨ Ask your doctor if you can take an over-the-counter pain medicine, such as acetaminophen (Tylenol), ibuprofen (Advil, Motrin), or naproxen (Aleve). Read and follow all instructions on the label. · If you have pain, try a warm compress. Soak a towel or washcloth in warm water. Wring it out, and place it on the affected skin. · Do not squeeze, drain, or puncture a painful lump. Doing this can irritate or inflame the lump, push any existing infection deeper into the skin, or cause severe bleeding. When should you call for help?   Call your doctor now or seek immediate medical care if:    · Your lymph nodes get bigger.     · The area becomes red and feels more tender.     · You have a fever that does not go away.    Watch closely for changes in your health, and be sure to contact your doctor if:    · You do not get better as expected. Where can you learn more? Go to http://rubi-ghassan.info/. Enter A618 in the search box to learn more about \"Lymphadenitis: Care Instructions. \"  Current as of: November 18, 2017  Content Version: 11.7  © 7114-7208 [employer]. Care instructions adapted under license by [employer] (which disclaims liability or warranty for this information). If you have questions about a medical condition or this instruction, always ask your healthcare professional. [address] any warranty or liability for your use of this information.

## 2018-12-19 DIAGNOSIS — I10 ESSENTIAL HYPERTENSION: ICD-10-CM

## 2018-12-19 RX ORDER — AMLODIPINE BESYLATE 10 MG/1
TABLET ORAL
Qty: 30 TAB | Refills: 0 | Status: SHIPPED | OUTPATIENT
Start: 2018-12-19 | End: 2019-01-18 | Stop reason: SDUPTHER

## 2018-12-19 RX ORDER — TRIAMTERENE/HYDROCHLOROTHIAZID 37.5-25 MG
TABLET ORAL
Qty: 30 TAB | Refills: 0 | Status: SHIPPED | OUTPATIENT
Start: 2018-12-19 | End: 2019-01-18 | Stop reason: SDUPTHER

## 2018-12-28 ENCOUNTER — OFFICE VISIT (OUTPATIENT)
Dept: INTERNAL MEDICINE CLINIC | Age: 53
End: 2018-12-28

## 2018-12-28 VITALS
WEIGHT: 240.4 LBS | DIASTOLIC BLOOD PRESSURE: 80 MMHG | SYSTOLIC BLOOD PRESSURE: 130 MMHG | TEMPERATURE: 98.4 F | HEART RATE: 83 BPM | BODY MASS INDEX: 31.72 KG/M2 | RESPIRATION RATE: 22 BRPM | OXYGEN SATURATION: 98 %

## 2018-12-28 DIAGNOSIS — I10 ESSENTIAL HYPERTENSION: Primary | ICD-10-CM

## 2018-12-28 DIAGNOSIS — Z86.011 H/O MENINGIOMA OF THE BRAIN: ICD-10-CM

## 2018-12-28 DIAGNOSIS — Z12.31 BREAST CANCER SCREENING BY MAMMOGRAM: ICD-10-CM

## 2018-12-28 DIAGNOSIS — E78.00 HYPERCHOLESTEROLEMIA: ICD-10-CM

## 2018-12-28 DIAGNOSIS — F17.200 TOBACCO DEPENDENCE: ICD-10-CM

## 2018-12-28 DIAGNOSIS — Z12.11 COLON CANCER SCREENING: ICD-10-CM

## 2018-12-28 DIAGNOSIS — E66.9 OBESITY (BMI 30.0-34.9): ICD-10-CM

## 2018-12-28 NOTE — PROGRESS NOTES
Health Maintenance Due Topic Date Due  
 Hepatitis C Screening  1965  COLONOSCOPY  05/02/1983  Pneumococcal 19-64 Medium Risk (1 of 1 - PPSV23) 05/02/1984  
 DTaP/Tdap/Td series (1 - Tdap) 05/02/1986  PAP AKA CERVICAL CYTOLOGY  05/02/1986  Shingrix Vaccine Age 50> (1 of 2) 05/02/2015  BREAST CANCER SCRN MAMMOGRAM  05/02/2015 Chief Complaint Patient presents with  Complete Physical  
  Routine Care- Check Up  Hypertension  Cholesterol Problem 1. Have you been to the ER, urgent care clinic since your last visit? Hospitalized since your last visit? No 
 
2. Have you seen or consulted any other health care providers outside of the [address] [place] [address] since your last visit? Include any pap smears or colon screening. No 
 
3) Do you have an Advance Directive on file? no 
 
4) Are you interested in receiving information on Advance Directives? NO Patient is accompanied by self I have received verbal consent from Lucila Maciel to discuss any/all medical information while they are present in the room.

## 2018-12-29 LAB
ALBUMIN SERPL-MCNC: 4.7 G/DL (ref 3.5–5.5)
ALBUMIN/GLOB SERPL: 1.2 {RATIO} (ref 1.2–2.2)
ALP SERPL-CCNC: 92 IU/L (ref 39–117)
ALT SERPL-CCNC: 27 IU/L (ref 0–32)
AST SERPL-CCNC: 26 IU/L (ref 0–40)
BILIRUB SERPL-MCNC: 0.3 MG/DL (ref 0–1.2)
BUN SERPL-MCNC: 11 MG/DL (ref 6–24)
BUN/CREAT SERPL: 11 (ref 9–23)
CALCIUM SERPL-MCNC: 9.9 MG/DL (ref 8.7–10.2)
CHLORIDE SERPL-SCNC: 98 MMOL/L (ref 96–106)
CHOLEST SERPL-MCNC: 228 MG/DL (ref 100–199)
CO2 SERPL-SCNC: 26 MMOL/L (ref 20–29)
CREAT SERPL-MCNC: 0.96 MG/DL (ref 0.57–1)
ERYTHROCYTE [DISTWIDTH] IN BLOOD BY AUTOMATED COUNT: 14.8 % (ref 12.3–15.4)
GLOBULIN SER CALC-MCNC: 3.8 G/DL (ref 1.5–4.5)
GLUCOSE SERPL-MCNC: 124 MG/DL (ref 65–99)
HCT VFR BLD AUTO: 44.5 % (ref 34–46.6)
HDLC SERPL-MCNC: 33 MG/DL
HGB BLD-MCNC: 14.9 G/DL (ref 11.1–15.9)
INTERPRETATION, 910389: NORMAL
LDLC SERPL CALC-MCNC: 169 MG/DL (ref 0–99)
MCH RBC QN AUTO: 27.6 PG (ref 26.6–33)
MCHC RBC AUTO-ENTMCNC: 33.5 G/DL (ref 31.5–35.7)
MCV RBC AUTO: 82 FL (ref 79–97)
PLATELET # BLD AUTO: 321 X10E3/UL (ref 150–379)
POTASSIUM SERPL-SCNC: 3 MMOL/L (ref 3.5–5.2)
PROT SERPL-MCNC: 8.5 G/DL (ref 6–8.5)
RBC # BLD AUTO: 5.4 X10E6/UL (ref 3.77–5.28)
SODIUM SERPL-SCNC: 142 MMOL/L (ref 134–144)
TRIGL SERPL-MCNC: 131 MG/DL (ref 0–149)
TSH SERPL DL<=0.005 MIU/L-ACNC: 2.21 UIU/ML (ref 0.45–4.5)
VLDLC SERPL CALC-MCNC: 26 MG/DL (ref 5–40)
WBC # BLD AUTO: 4.9 X10E3/UL (ref 3.4–10.8)

## 2019-01-04 NOTE — PROGRESS NOTES
High cholesterol ---- watch fatty food/exercise Start Lipitor 20 mg 1 QHS Low potassium ---> is she taking her KCL as directed? High BS -- watch sweets/carbs/starchy food Recheck BMP,Lipids,ALT,AST, A1c in 6-8 weeks

## 2019-01-07 ENCOUNTER — TELEPHONE (OUTPATIENT)
Dept: INTERNAL MEDICINE CLINIC | Age: 54
End: 2019-01-07

## 2019-01-07 DIAGNOSIS — E78.00 HIGH CHOLESTEROL: Primary | ICD-10-CM

## 2019-01-07 RX ORDER — ATORVASTATIN CALCIUM 20 MG/1
20 TABLET, FILM COATED ORAL
Qty: 30 TAB | Refills: 0 | Status: SHIPPED | OUTPATIENT
Start: 2019-01-07 | End: 2019-02-06

## 2019-01-07 NOTE — TELEPHONE ENCOUNTER
Spoke with patient after verifying name and . Notified patient of lab results and recommendation from provider. Patient verbalized understanding and given a chance to ask questions. Order for Lipitor sent to pts pharmacy per providers VO.

## 2019-01-10 ENCOUNTER — OFFICE VISIT (OUTPATIENT)
Dept: URGENT CARE | Age: 54
End: 2019-01-10

## 2019-01-10 VITALS
HEIGHT: 72 IN | RESPIRATION RATE: 20 BRPM | HEART RATE: 113 BPM | BODY MASS INDEX: 31.56 KG/M2 | DIASTOLIC BLOOD PRESSURE: 92 MMHG | WEIGHT: 233 LBS | OXYGEN SATURATION: 97 % | SYSTOLIC BLOOD PRESSURE: 160 MMHG | TEMPERATURE: 97.5 F

## 2019-01-10 DIAGNOSIS — K04.7 DENTAL ABSCESS: Primary | ICD-10-CM

## 2019-01-10 RX ORDER — AMOXICILLIN AND CLAVULANATE POTASSIUM 875; 125 MG/1; MG/1
1 TABLET, FILM COATED ORAL 2 TIMES DAILY
Qty: 20 TAB | Refills: 0 | Status: SHIPPED | OUTPATIENT
Start: 2019-01-10 | End: 2019-01-20

## 2019-01-10 RX ORDER — NAPROXEN 500 MG/1
500 TABLET ORAL
Qty: 30 TAB | Refills: 0 | Status: SHIPPED | OUTPATIENT
Start: 2019-01-10 | End: 2020-05-12 | Stop reason: ALTCHOICE

## 2019-01-10 NOTE — PROGRESS NOTES
Dental Pain    The history is provided by the patient. This is a new problem. The current episode started yesterday. The problem occurs constantly. The problem has been gradually worsening. The pain is located in the right lower mouth. The quality of the pain is constant. The pain is moderate. Associated symptoms include swelling and gum redness. There was no vomiting, no nausea, no fever, no chest pain, no shortness of breath, no headaches and no drainage. She has tried nothing for the symptoms. Past Medical History:   Diagnosis Date    Arthritis     Follicular adenoma of thyroid gland 12/5/2012    Hypercholesterolemia     Hypertension     Migraine         Past Surgical History:   Procedure Laterality Date    HX GYN      hysterectomy    HX TUMOR REMOVAL           Family History   Adopted: Yes   Family history unknown: Yes        Social History     Socioeconomic History    Marital status: SINGLE     Spouse name: Not on file    Number of children: Not on file    Years of education: Not on file    Highest education level: Not on file   Social Needs    Financial resource strain: Not on file    Food insecurity - worry: Not on file    Food insecurity - inability: Not on file   Breeze needs - medical: Not on file   Breeze needs - non-medical: Not on file   Occupational History    Not on file   Tobacco Use    Smoking status: Current Every Day Smoker     Packs/day: 0.25     Types: Cigarettes    Smokeless tobacco: Never Used   Substance and Sexual Activity    Alcohol use: No     Alcohol/week: 0.0 oz    Drug use: Yes     Types: Marijuana     Comment: Daily    Sexual activity: No   Other Topics Concern    Not on file   Social History Narrative    Not on file                ALLERGIES: Latex and Shrimp    Review of Systems   Constitutional: Negative for activity change, appetite change, chills and fever. HENT: Positive for dental problem.  Negative for congestion, ear pain, rhinorrhea, sinus pressure, sinus pain, sore throat and trouble swallowing. Respiratory: Negative for cough, shortness of breath and wheezing. Cardiovascular: Negative for chest pain and palpitations. Gastrointestinal: Negative for nausea and vomiting. Musculoskeletal: Negative for myalgias. Neurological: Negative for dizziness and headaches. Hematological: Positive for adenopathy. Vitals:    01/10/19 1115   BP: (!) 160/92   Pulse: (!) 113   Resp: 20   Temp: 97.5 °F (36.4 °C)   SpO2: 97%   Weight: 233 lb (105.7 kg)   Height: 6' 1\" (1.854 m)       Physical Exam   Constitutional: She appears well-developed and well-nourished. No distress. HENT:   Head:       Right Ear: Tympanic membrane, external ear and ear canal normal.   Left Ear: Tympanic membrane, external ear and ear canal normal.   Nose: Nose normal. Right sinus exhibits no maxillary sinus tenderness and no frontal sinus tenderness. Left sinus exhibits no maxillary sinus tenderness and no frontal sinus tenderness. Mouth/Throat: Oropharynx is clear and moist and mucous membranes are normal. No trismus in the jaw. Dental abscesses (left lower gum: erythematous, swollen, TTP) present. No oropharyngeal exudate, posterior oropharyngeal edema, posterior oropharyngeal erythema or tonsillar abscesses. Cardiovascular: Normal rate, regular rhythm and normal heart sounds. Pulmonary/Chest: Effort normal and breath sounds normal. No respiratory distress. She has no wheezes. She has no rales. Lymphadenopathy:     She has cervical adenopathy. Neurological: She is alert. Skin: She is not diaphoretic. Psychiatric: She has a normal mood and affect. Her behavior is normal. Judgment and thought content normal.   Nursing note and vitals reviewed. [facility]    ICD-10-CM ICD-9-CM    1.  Dental abscess K04.7 522.5      Medications Ordered Today   Medications    amoxicillin-clavulanate (AUGMENTIN) 875-125 mg per tablet     Sig: Take 1 Tab by mouth two (2) times a day for 10 days. Dispense:  20 Tab     Refill:  0    naproxen (NAPROSYN) 500 mg tablet     Sig: Take 1 Tab by mouth every twelve (12) hours as needed for Pain. Dispense:  30 Tab     Refill:  0     The patients condition was discussed with the patient and they understand. The patient is to follow up with Dentist. If signs and symptoms become worse the pt is to go to the ER. The patient is to take medications as prescribed.              Procedures

## 2019-01-10 NOTE — LETTER
[address] [address] [address] [place] [address] 
[phone] Work/School Note Date: 1/10/2019 To Whom It May concern: 
 
Sunny Brunson was seen and treated today in the urgent care center. Sunny Brunson may return to work Monday, January, 14, 2019. Sincerely, Juanita Sue NRP

## 2019-01-10 NOTE — PATIENT INSTRUCTIONS
Follow up with Dentist  ER if worse     Abscessed Tooth: Care Instructions  Your Care Instructions    An abscessed tooth is a tooth that has a pocket of pus in the tissues around it. Pus forms when the body tries to fight an infection caused by bacteria. If the pus cannot drain, it forms an abscess. An abscessed tooth can cause red, swollen gums and throbbing pain, especially when you chew. You may have a bad taste in your mouth and a fever, and your jaw may swell. Damage to the tooth, untreated tooth decay, or gum disease can cause an abscessed tooth. An abscessed tooth needs to be treated by a dental professional right away. If it is not treated, the infection could spread to other parts of your body. Your dentist will give you antibiotics to stop the infection. He or she may make a hole in the tooth or cut open (ashley) the abscess inside your mouth so that the infection can drain, which should relieve your pain. You may need to have a root canal treatment, which tries to save your tooth by taking out the infected pulp and replacing it with a healing medicine and/or a filling. If these treatments do not work, your tooth may have to be removed. Follow-up care is a key part of your treatment and safety. Be sure to make and go to all appointments, and call your doctor if you are having problems. It's also a good idea to know your test results and keep a list of the medicines you take. How can you care for yourself at home? · Reduce pain and swelling in your face and jaw by putting ice or a cold pack on the outside of your cheek for 10 to 20 minutes at a time. Put a thin cloth between the ice and your skin. · Take pain medicines exactly as directed. ? If the doctor gave you a prescription medicine for pain, take it as prescribed. ? If you are not taking a prescription pain medicine, ask your doctor if you can take an over-the-counter medicine. · Take your antibiotics as directed.  Do not stop taking them just because you feel better. You need to take the full course of antibiotics. To prevent tooth abscess  · Brush and floss every day, and have regular dental checkups. · Eat a healthy diet, and avoid sugary foods and drinks. · Do not smoke, use e-cigarettes with nicotine, or use spit tobacco. Tobacco and nicotine slow your ability to heal. Tobacco also increases your risk for gum disease and cancer of the mouth and throat. If you need help quitting, talk to your doctor about stop-smoking programs and medicines. These can increase your chances of quitting for good. When should you call for help? Call 911 anytime you think you may need emergency care. For example, call if:    · You have trouble breathing.    Call your doctor now or seek immediate medical care if:    · You have new or worse symptoms of infection, such as:  ? Increased pain, swelling, warmth, or redness. ? Red streaks leading from the area. ? Pus draining from the area. ? A fever.    Watch closely for changes in your health, and be sure to contact your doctor if:    · You do not get better as expected. Where can you learn more? Go to http://rubi-ghassan.info/. Enter G588 in the search box to learn more about \"Abscessed Tooth: Care Instructions. \"  Current as of: March 28, 2018  Content Version: 11.8  © 3926-3032 [employer]. Care instructions adapted under license by "[employer] (which disclaims liability or warranty for this information). If you have questions about a medical condition or this instruction, always ask your healthcare professional. [address] [address] any warranty or liability for your use of this information.

## 2019-04-19 ENCOUNTER — OFFICE VISIT (OUTPATIENT)
Dept: INTERNAL MEDICINE CLINIC | Age: 54
End: 2019-04-19

## 2019-04-19 VITALS
BODY MASS INDEX: 32.23 KG/M2 | DIASTOLIC BLOOD PRESSURE: 91 MMHG | SYSTOLIC BLOOD PRESSURE: 141 MMHG | WEIGHT: 238 LBS | HEIGHT: 72 IN | RESPIRATION RATE: 20 BRPM | HEART RATE: 79 BPM | OXYGEN SATURATION: 97 %

## 2019-04-19 DIAGNOSIS — D17.21 LIPOMA OF RIGHT UPPER EXTREMITY: Primary | ICD-10-CM

## 2019-04-19 RX ORDER — HYDROCODONE BITARTRATE AND ACETAMINOPHEN 5; 325 MG/1; MG/1
1 TABLET ORAL
Qty: 20 TAB | Refills: 0 | Status: SHIPPED | OUTPATIENT
Start: 2019-04-19 | End: 2019-04-22

## 2019-04-19 RX ORDER — INDOMETHACIN 50 MG/1
50 CAPSULE ORAL
COMMUNITY
Start: 2019-04-14 | End: 2020-05-12 | Stop reason: ALTCHOICE

## 2019-04-19 NOTE — PROGRESS NOTES
uHnter Andrews is a 48 y.o. female    Chief Complaint   Patient presents with    Arm Pain    Joint Pain     1. Have you been to the ER, urgent care clinic since your last visit? Hospitalized since your last visit? No       2. Have you seen or consulted any other health care providers outside of the [address] [place] [address] since your last visit? Include any pap smears or colon screening.   No     Visit Vitals  BP (!) 141/91   Pulse 79   Resp 20   Ht 6' 1\" (1.854 m)   Wt 238 lb (108 kg)   SpO2 97%   BMI 31.40 kg/m²

## 2019-04-19 NOTE — PROGRESS NOTES
Subjective:     Chief Complaint   Patient presents with    Arm Pain    Joint Pain       History of Present Illness    Edvin Oliva is a 48y.o. year old female who is a patient of Dr. Jad Palma that presents today with complaints of a painful mass to her upper right arm. She states it has been there for several years without any problems. She states it is now growing and causing her pain. No redness or drainage. NAD. [facility] and told to follow-up with PCP and given an rx for Indomethacin. No relief with the med. No other new complaints at this time. No CP, SOB, GI, or  symptoms. Reviewed medications, recent lab work and imaging with patient. Pt reports compliance with medications. Current Outpatient Medications on File Prior to Visit   Medication Sig Dispense Refill    indomethacin (INDOCIN) 50 mg capsule       triamterene-hydroCHLOROthiazide (MAXZIDE) 37.5-25 mg per tablet TAKE 1 TABLET BY MOUTH DAILY 90 Tab 1    amLODIPine (NORVASC) 10 mg tablet TAKE 1 TABLET BY MOUTH EVERY DAY 90 Tab 1    azelastine (ASTELIN) 137 mcg (0.1 %) nasal spray 1 [place] by Both Nostrils route two (2) times a day. Use in each nostril as directed 1 Bottle 0    OTHER [facility] ( fax [phone]) evaluate and treat patient for DJD, back pain, and left knee pain 1 Each 0    potassium chloride (K-DUR, KLOR-CON) 20 mEq tablet TAKE 1 TABLET BY MOUTH TWICE DAILY 60 Tab 5    tiZANidine (ZANAFLEX) 4 mg tablet Take 1 Tab by mouth three (3) times daily as needed. 30 Tab 0    albuterol (PROVENTIL HFA, VENTOLIN HFA, PROAIR HFA) 90 mcg/actuation inhaler Take 2 Puffs by inhalation every six (6) hours as needed for Wheezing. 1 Inhaler 1    naproxen (NAPROSYN) 500 mg tablet Take 1 Tab by mouth every twelve (12) hours as needed for Pain. 30 Tab 0     No current facility-administered medications on file prior to visit.         Allergies   Allergen Reactions    Latex Other (comments)     Tape peels skin    Shrimp Itching      Past Medical History:   Diagnosis Date    Arthritis     Follicular adenoma of thyroid gland 12/5/2012    Hypercholesterolemia     Hypertension     Migraine       Past Surgical History:   Procedure Laterality Date    HX GYN      hysterectomy    HX TUMOR REMOVAL        Social History     Tobacco Use    Smoking status: Current Every Day Smoker     Packs/day: 0.25     Types: Cigarettes    Smokeless tobacco: Never Used   Substance Use Topics    Alcohol use: No     Alcohol/week: 0.0 oz    Drug use: Yes     Types: Marijuana     Comment: Daily      Family History   Adopted: Yes   Family history unknown: Yes        Objective:     Vitals:    04/19/19 0843   BP: (!) 141/91   Pulse: 79   Resp: 20   SpO2: 97%   Weight: 238 lb (108 kg)   Height: 6' 1\" (1.854 m)       Review of Systems   Constitutional: Negative. HENT: Negative. Eyes: Negative. Respiratory: Negative. Cardiovascular: Negative. Gastrointestinal: Negative. Genitourinary: Negative. Musculoskeletal: Negative. Skin: Negative. Mass right upper arm   Neurological: Negative. Psychiatric/Behavioral: Negative. Physical Exam   Constitutional: She is oriented to person, place, and time. She appears well-developed and well-nourished. No distress. Pleasant AA female. NAD   HENT:   Head: Normocephalic and atraumatic. Mouth/Throat: Oropharynx is clear and moist.   Eyes: Conjunctivae are normal.   Neck: Normal range of motion. Neck supple. No JVD present. No thyromegaly present. Cardiovascular: Normal rate, regular rhythm, normal heart sounds and intact distal pulses. No murmur heard. Pulmonary/Chest: Effort normal and breath sounds normal. No respiratory distress. She has no wheezes. She has no rales. Abdominal: Soft. Bowel sounds are normal. She exhibits no distension. Musculoskeletal: She exhibits no edema or tenderness.    R shoulder lipoma Neurological: She is alert and oriented to person, place, and time. Coordination normal.   Skin: Skin is warm and dry. No rash noted. Psychiatric: She has a normal mood and affect. Her behavior is normal.   Nursing note and vitals reviewed. Assessment/ Plan:   Diagnoses and all orders for this visit:    1. Lipoma of right upper extremity   Will order  -     REFERRAL TO GENERAL SURGERY  -     HYDROcodone-acetaminophen (NORCO) 5-325 mg per tablet; Take 1 Tab by mouth every four (4) hours as needed for Pain for up to 3 days. Max Daily Amount: 6 Tabs. Patient's plan of care has been reviewed with them. Patient and/or family have verbally conveyed their understanding and agreement of the patient's signs, symptoms, diagnosis, treatment and prognosis and additionally agree to follow up as recommended or return to [facility] should their condition change prior to follow-up. Discharge instructions have also been provided to the patient with some educational information regarding their diagnosis as well a list of reasons why they would want to return to the office prior to their follow-up appointment should their condition change. Follow-up with Dr. Seth Live as scheduled.

## 2019-05-09 ENCOUNTER — OFFICE VISIT (OUTPATIENT)
Dept: SURGERY | Age: 54
End: 2019-05-09

## 2019-05-09 ENCOUNTER — TELEPHONE (OUTPATIENT)
Dept: SURGERY | Age: 54
End: 2019-05-09

## 2019-05-09 VITALS
OXYGEN SATURATION: 99 % | TEMPERATURE: 98.1 F | WEIGHT: 238 LBS | HEIGHT: 72 IN | BODY MASS INDEX: 32.23 KG/M2 | HEART RATE: 82 BPM | DIASTOLIC BLOOD PRESSURE: 76 MMHG | RESPIRATION RATE: 16 BRPM | SYSTOLIC BLOOD PRESSURE: 132 MMHG

## 2019-05-09 DIAGNOSIS — D17.21 LIPOMA OF RIGHT UPPER EXTREMITY: Primary | ICD-10-CM

## 2019-05-09 RX ORDER — ACETAMINOPHEN 325 MG/1
1000 TABLET ORAL ONCE
Status: CANCELLED | OUTPATIENT
Start: 2019-05-09 | End: 2019-05-10

## 2019-05-09 RX ORDER — BUPIVACAINE HYDROCHLORIDE 2.5 MG/ML
30 INJECTION, SOLUTION EPIDURAL; INFILTRATION; INTRACAUDAL ONCE
Status: CANCELLED | OUTPATIENT
Start: 2019-05-09 | End: 2019-05-09

## 2019-05-09 NOTE — PROGRESS NOTES
HISTORY OF PRESENT ILLNESS  Diandra Monroe is a 47 y.o. female who is referred by Gabriela Westfall NP for further evaluation of a lipoma of the right upper arm. Ms. Julián Diez tells me that she has had a subcutaneous mass on the lateral aspect of her right upper arm for some time now. The mass has become progressively larger and more bothersome to her. No associated drainage. Found to have a lipoma. She has otherwise been in her usual state of health. Past Medical History:  No date: Arthritis  12/5/2012: Follicular adenoma of thyroid gland  No date: Hypercholesterolemia  No date: Hypertension  5/9/2019: Lipoma of right upper extremity  No date: Migraine    Past Surgical History:  No date: HX GYN      Comment:  hysterectomy  No date: HX TUMOR REMOVAL    Review of patient's family history indicates: Adopted:  Yes    Social History: Employment - , [employer]. Tobacco - Cigarettes, approx. 1/4 pack per day. EtOH - Wine, maybe one per week. Review of systems negative except as noted. Review of Systems   Musculoskeletal:        Discomfort at site of lipoma. Physical Exam   Constitutional: She appears well-developed and well-nourished. No distress. HENT:   Head: Normocephalic and atraumatic. Eyes: No scleral icterus. Neck: Neck supple. Cardiovascular: Normal rate and regular rhythm. Pulmonary/Chest: Effort normal and breath sounds normal.   Abdominal: Soft. She exhibits no distension. There is no tenderness. Musculoskeletal: Normal range of motion. Lymphadenopathy:     She has no cervical adenopathy. Neurological: She is alert. Skin:        Approx. 5cm x 4cm, well circumscribed, freely movable subcutaneous mass. Clinically, this is c/w a lipoma. Vitals reviewed. ASSESSMENT and PLAN  In view of the findings on H and P, Ms. Julián Diez should benefit from excision of the lipoma as it is bothersome to her.  Discussed procedure with her including risks of bleeding, infection, recurrence. She understands and wishes to proceed. I have tentatively scheduled Ms. Tenzin Archer for surgery on May 23, 2019 at [facility] and will see her back in the office postoperatively. She is agreeable to this plan of action and is most certainly free to contact the office should any questions or concerns arise.        CC: DO Gracie Echeverria, NP

## 2019-05-09 NOTE — PROGRESS NOTES
1. Have you been to the ER, urgent care clinic since your last visit? Hospitalized since your last visit? No    2. Have you seen or consulted any other health care providers outside of the [address] [place] [address] since your last visit? Include any pap smears or colon screening.  No

## 2019-05-22 ENCOUNTER — TELEPHONE (OUTPATIENT)
Dept: SURGERY | Age: 54
End: 2019-05-22

## 2019-05-22 NOTE — TELEPHONE ENCOUNTER
Left voicemail for patient to confirm she is cancelling surgery: received a call from [facility] [place]; [facility] [place] states that she caleld patient to remind about surgery and patient states that she was not coming to surgery on 05/23/2019.  will advise Dr, and wait for return call before cancelling surgery.

## 2019-08-30 DIAGNOSIS — I10 ESSENTIAL HYPERTENSION: ICD-10-CM

## 2019-08-30 RX ORDER — TRIAMTERENE/HYDROCHLOROTHIAZID 37.5-25 MG
TABLET ORAL
Qty: 90 TAB | Refills: 0 | Status: SHIPPED | OUTPATIENT
Start: 2019-08-30 | End: 2020-02-03

## 2019-08-30 RX ORDER — AMLODIPINE BESYLATE 10 MG/1
TABLET ORAL
Qty: 90 TAB | Refills: 0 | Status: SHIPPED | OUTPATIENT
Start: 2019-08-30 | End: 2020-02-03

## 2020-02-02 DIAGNOSIS — I10 ESSENTIAL HYPERTENSION: ICD-10-CM

## 2020-02-03 RX ORDER — AMLODIPINE BESYLATE 10 MG/1
TABLET ORAL
Qty: 90 TAB | Refills: 0 | Status: SHIPPED | OUTPATIENT
Start: 2020-02-03 | End: 2020-05-12 | Stop reason: SDUPTHER

## 2020-02-03 RX ORDER — TRIAMTERENE/HYDROCHLOROTHIAZID 37.5-25 MG
TABLET ORAL
Qty: 90 TAB | Refills: 0 | Status: SHIPPED | OUTPATIENT
Start: 2020-02-03 | End: 2020-05-12 | Stop reason: SDUPTHER

## 2020-05-12 ENCOUNTER — VIRTUAL VISIT (OUTPATIENT)
Dept: INTERNAL MEDICINE CLINIC | Age: 55
End: 2020-05-12

## 2020-05-12 VITALS — HEIGHT: 72 IN | BODY MASS INDEX: 31.4 KG/M2

## 2020-05-12 DIAGNOSIS — R73.9 HYPERGLYCEMIA: ICD-10-CM

## 2020-05-12 DIAGNOSIS — R52 PAIN OF LEFT SIDE OF BODY: ICD-10-CM

## 2020-05-12 DIAGNOSIS — Z86.011 H/O MENINGIOMA OF THE BRAIN: ICD-10-CM

## 2020-05-12 DIAGNOSIS — E55.9 VITAMIN D DEFICIENCY: ICD-10-CM

## 2020-05-12 DIAGNOSIS — E66.9 OBESITY (BMI 30.0-34.9): ICD-10-CM

## 2020-05-12 DIAGNOSIS — I10 ESSENTIAL HYPERTENSION: Primary | ICD-10-CM

## 2020-05-12 DIAGNOSIS — E78.00 HYPERCHOLESTEROLEMIA: ICD-10-CM

## 2020-05-12 RX ORDER — POTASSIUM CHLORIDE 20 MEQ/1
TABLET, EXTENDED RELEASE ORAL
Qty: 180 TAB | Refills: 1 | Status: SHIPPED | OUTPATIENT
Start: 2020-05-12 | End: 2022-07-21 | Stop reason: SDUPTHER

## 2020-05-12 RX ORDER — TRIAMTERENE/HYDROCHLOROTHIAZID 37.5-25 MG
TABLET ORAL
Qty: 90 TAB | Refills: 1 | Status: SHIPPED | OUTPATIENT
Start: 2020-05-12 | End: 2020-12-11

## 2020-05-12 RX ORDER — AMLODIPINE BESYLATE 10 MG/1
TABLET ORAL
Qty: 90 TAB | Refills: 1 | Status: SHIPPED | OUTPATIENT
Start: 2020-05-12 | End: 2020-12-11

## 2020-05-12 NOTE — PROGRESS NOTES
Identified pt with two pt identifiers(name and ). Reviewed record in preparation for visit and have obtained necessary documentation. Chief Complaint   Patient presents with    Abdominal Pain     L side, between rib cage and hip bone; was an intermittent dull ache for last week but has been consistent since last friday    Medication Refill     HTN meds, potassium, vit d 50,000iu        Health Maintenance Due   Topic    Hepatitis C Screening     Pneumococcal 0-64 years (1 of 1 - PPSV23)    Colonoscopy     DTaP/Tdap/Td series (1 - Tdap)    PAP AKA CERVICAL CYTOLOGY     Shingrix Vaccine Age 50> (1 of 2)    Breast Cancer Screen Mammogram        Coordination of Care Questionnaire:  :   1) Have you been to an emergency room, urgent care, or hospitalized since your last visit? If yes, where when, and reason for visit? no       2. Have seen or consulted any other health care provider since your last visit? If yes, where when, and reason for visit? NO      Patient is accompanied by self I have received verbal consent from Marylu Reid to discuss any/all medical information while they are present in the room.

## 2020-05-12 NOTE — PROGRESS NOTES
Gloria Real is a 54 y.o. female who was seen by synchronous (real-time) audio-video technology on 5/12/2020. Consent: Gloria Real, who was seen by synchronous (real-time) audio-video technology, and/or her healthcare decision maker, is aware that this patient-initiated, Telehealth encounter on 5/12/2020 is a billable service, with coverage as determined by her insurance carrier. She is aware that she may receive a bill and has provided verbal consent to proceed: Yes. Assessment & Plan:   Diagnoses and all orders for this visit:    1. Essential hypertension  -     LIPID PANEL  -     METABOLIC PANEL, COMPREHENSIVE  -     CBC W/O DIFF  -     amLODIPine (NORVASC) 10 mg tablet; TAKE 1 TABLET BY MOUTH EVERY DAY  -     potassium chloride (K-DUR, KLOR-CON) 20 mEq tablet; TAKE 1 TABLET BY MOUTH TWICE DAILY    2. Hypercholesterolemia  -     LIPID PANEL    3. H/O meningioma of the brain    4. Obesity (BMI 30.0-34.9)    5. Pain of left side of body    6. Hyperglycemia  -     METABOLIC PANEL, COMPREHENSIVE  -     HEMOGLOBIN A1C WITH EAG    7. Vitamin D deficiency  -     VITAMIN D, 25 HYDROXY    Other orders  -     triamterene-hydroCHLOROthiazide (MAXZIDE) 37.5-25 mg per tablet; TAKE 1 TABLET BY MOUTH DAILY          I spent at least 23 minutes on this visit with this established patient. ([id number])    Subjective:   Gloria Real is a 54 y.o. female who was seen for Abdominal Pain (L side, between rib cage and hip bone; was an intermittent dull ache for last week but has been consistent since last friday) and Medication Refill (HTN meds, potassium, vit d 50,000iu). Patient is seen after over a year. Has a few chronic medical issues. Reports having recent left-sided pain from ribs to the hip. Denies any obvious trauma. No bruising or swelling. No radiation. OTC NSAIDs helps some. Denies any GI or  symptoms. Has been working from home because of COVID-19.   Denies any related symptoms including fever, cough, dyspnea, chest pain. Med list and most recent labs reviewed. Has not had any labs since 12/2018. She is obese. On BP medications. Denies tobacco or alcohol use. Due for repeat labs and medication refills. Denies any other new problems. Refill medications  Recheck labs  Continue OTC NSAIDs as needed  Advised patient to monitor area of pain and if not better over next couple weeks to get back with us  COVID-19 precautions discussed with pt  Follow-up 6 months or as needed      Prior to Admission medications    Medication Sig Start Date End Date Taking? Authorizing Provider   amLODIPine (NORVASC) 10 mg tablet TAKE 1 TABLET BY MOUTH EVERY DAY 5/12/20  Yes Rakan Martin DO   triamterene-hydroCHLOROthiazide (MAXZIDE) 37.5-25 mg per tablet TAKE 1 TABLET BY MOUTH DAILY 5/12/20  Yes Rakan Martin DO   potassium chloride (K-DUR, KLOR-CON) 20 mEq tablet TAKE 1 TABLET BY MOUTH TWICE DAILY 5/12/20  Yes Rakan Martin DO   azelastine (ASTELIN) 137 mcg (0.1 %) nasal spray 1 [place] by Both Nostrils route two (2) times a day. Use in each nostril as directed 5/15/18  Yes Rakan Martin DO   albuterol (PROVENTIL HFA, VENTOLIN HFA, PROAIR HFA) 90 mcg/actuation inhaler Take 2 Puffs by inhalation every six (6) hours as needed for Wheezing. 10/23/15  Yes Jim Martin DO   amLODIPine (NORVASC) 10 mg tablet TAKE 1 TABLET BY MOUTH EVERY DAY 2/3/20 5/12/20  Khalif Alert, NP   triamterene-hydroCHLOROthiazide (MAXZIDE) 37.5-25 mg per tablet TAKE 1 TABLET BY MOUTH DAILY 2/3/20 5/12/20  Hemanth Pham NP   indomethacin (INDOCIN) 50 mg capsule Take 50 mg by mouth. 4/14/19 5/12/20  Provider, Historical   naproxen (NAPROSYN) 500 mg tablet Take 1 Tab by mouth every twelve (12) hours as needed for Pain.  1/10/19 5/12/20  Kieran Kate MD   OTHER [facility] ( fax [phone]) evaluate and treat patient for DJD, back pain, and left knee pain 4/16/18   Leah Sukhjinder, DO   potassium chloride (K-DUR, KLOR-CON) 20 mEq tablet TAKE 1 TABLET BY MOUTH TWICE DAILY 4/10/18 5/12/20  Rosario Ada, DO   tiZANidine (ZANAFLEX) 4 mg tablet Take 1 Tab by mouth three (3) times daily as needed. 4/10/18 5/12/20  Rosario Ada, DO     Allergies   Allergen Reactions    Latex Other (comments)     Tape peels skin    Shrimp Itching       Patient Active Problem List    Diagnosis Date Noted    Hyperglycemia 05/12/2020    Lipoma of right upper extremity 05/09/2019    Class 1 obesity with serious comorbidity and body mass index (BMI) of 33.0 to 33.9 in adult 04/10/2018    Hypercholesterolemia 04/19/2016    BUNN (dyspnea on exertion) 10/23/2015    Tobacco dependence 10/23/2015    HTN (hypertension) 03/09/2015    Left knee pain 03/09/2015    DJD (degenerative joint disease) 03/09/2015    H/O: hysterectomy 03/09/2015    H/O meningioma of the brain 03/09/2015    H/O bilateral breast reduction surgery 03/09/2015    Benign thyroid cyst 03/05/0506    Follicular adenoma of thyroid gland 12/05/2012     Current Outpatient Medications   Medication Sig Dispense Refill    amLODIPine (NORVASC) 10 mg tablet TAKE 1 TABLET BY MOUTH EVERY DAY 90 Tab 1    triamterene-hydroCHLOROthiazide (MAXZIDE) 37.5-25 mg per tablet TAKE 1 TABLET BY MOUTH DAILY 90 Tab 1    potassium chloride (K-DUR, KLOR-CON) 20 mEq tablet TAKE 1 TABLET BY MOUTH TWICE DAILY 180 Tab 1    azelastine (ASTELIN) 137 mcg (0.1 %) nasal spray 1 [place] by Both Nostrils route two (2) times a day. Use in each nostril as directed 1 Bottle 0    albuterol (PROVENTIL HFA, VENTOLIN HFA, PROAIR HFA) 90 mcg/actuation inhaler Take 2 Puffs by inhalation every six (6) hours as needed for Wheezing.  1 Inhaler 1    OTHER [facility] ( fax [phone]) evaluate and treat patient for DJD, back pain, and left knee pain 1 Each 0     Allergies   Allergen Reactions    Latex Other (comments)     Tape peels skin    Shrimp Itching     Past Medical History:   Diagnosis Date  Arthritis     Follicular adenoma of thyroid gland 12/5/2012    Hypercholesterolemia     Hypertension     Lipoma of right upper extremity 5/9/2019    Migraine      Social History     Tobacco Use    Smoking status: Current Every Day Smoker     Packs/day: 0.25     Types: Cigarettes    Smokeless tobacco: Never Used   Substance Use Topics    Alcohol use: No     Alcohol/week: 0.0 standard drinks       ROS    Objective:   Vital Signs: (As obtained by patient/caregiver at home)  Visit Vitals  Height 6' 1\" (1.854 m)   Body Mass Index 31.40 kg/m²        [INSTRUCTIONS:  \"[x]\" Indicates a positive item  \"[]\" Indicates a negative item  -- DELETE ALL ITEMS NOT EXAMINED]    Constitutional: [x] Appears well-developed and well-nourished [x] No apparent distress      [] Abnormal -     Mental status: [x] Alert and awake  [x] Oriented to person/place/time [x] Able to follow commands    [] Abnormal -     Eyes:   EOM    [x]  Normal    [] Abnormal -   Sclera  [x]  Normal    [] Abnormal -          Discharge [x]  None visible   [] Abnormal -     HENT: [x] Normocephalic, atraumatic  [] Abnormal -   [x] Mouth/Throat: Mucous membranes are moist    External Ears [x] Normal  [] Abnormal -    Neck: [x] No visualized mass [] Abnormal -     Pulmonary/Chest: [x] Respiratory effort normal   [x] No visualized signs of difficulty breathing or respiratory distress        [] Abnormal -      Musculoskeletal:   [x] Normal gait with no signs of ataxia         [x] Normal range of motion of neck        [] Abnormal -     Neurological:        [x] No Facial Asymmetry (Cranial nerve 7 motor function) (limited exam due to video visit)          [x] No gaze palsy        [] Abnormal -          Skin:        [x] No significant exanthematous lesions or discoloration noted on facial skin         [] Abnormal -            Psychiatric:       [x] Normal Affect [] Abnormal -        [x] No Hallucinations    Other pertinent observable physical exam findings:-        We discussed the expected course, resolution and complications of the diagnosis(es) in detail. Medication risks, benefits, costs, interactions, and alternatives were discussed as indicated. I advised her to contact the office if her condition worsens, changes or fails to improve as anticipated. She expressed understanding with the diagnosis(es) and plan. Roxanne Morris is a 54 y.o. female who was evaluated by a video visit encounter for concerns as above. Patient identification was verified prior to start of the visit. A caregiver was present when appropriate. Due to this being a TeleHealth encounter (During MMUIR-09 public health emergency), evaluation of the following organ systems was limited: Vitals/Constitutional/EENT/Resp/CV/GI//MS/Neuro/Skin/Heme-Lymph-Imm. Pursuant to the emergency declaration under the [facility], [facility]5 waiver authority and the [employer] and [employer] Act, this Virtual  Visit was conducted, with patient's (and/or legal guardian's) consent, to reduce the patient's risk of exposure to COVID-19 and provide necessary medical care. Services were provided through a video synchronous discussion virtually to substitute for in-person clinic visit. Patient and provider were located at their individual homes.       Lynsey Shaikh,

## 2022-03-16 ENCOUNTER — NURSE TRIAGE (OUTPATIENT)
Dept: OTHER | Facility: CLINIC | Age: 57
End: 2022-03-16

## 2022-03-16 NOTE — TELEPHONE ENCOUNTER
Received call from Radha at [facility] with Red Flag Complaint. Subjective: Caller states \"While in the OBGYN office, they took my blood pressure and my readings were 218/130 and 211/126. I have not taken my blood pressure medications in a while and that is my fault. It has been 8 months to a year since Iast had it. \"     Current Symptoms: Denies     Onset: 1 year ago; intermittent    Associated Symptoms: NA    Pain Severity: Denies   Temperature: Denies   What has been tried: Denies      LMP: NA Pregnant: NA    Recommended disposition: Go to [facility]. Pt advised to go to [address] [place], [place] [address]r [facility] if no available appointments. Care advice provided, patient verbalizes understanding; denies any other questions or concerns; instructed to call back for any new or worsening symptoms. Patient/Caller agrees with recommended disposition; writer [facility] warm [facility] for appointment scheduling    Attention Provider: Thank you for allowing me to participate in the care of your patient. The patient was connected to triage in response to information provided to the [facility]. Please do not respond through this encounter as the response is not directed to a shared pool.       Reason for Disposition   Systolic BP >= 883 OR Diastolic >= 322 and having NO cardiac or neurologic symptoms    Protocols used: BLOOD PRESSURE - HIGH-ADULT-OH

## 2022-03-19 PROBLEM — E66.811 CLASS 1 OBESITY WITH SERIOUS COMORBIDITY AND BODY MASS INDEX (BMI) OF 33.0 TO 33.9 IN ADULT: Status: ACTIVE | Noted: 2018-04-10

## 2022-03-19 PROBLEM — D17.21 LIPOMA OF RIGHT UPPER EXTREMITY: Status: ACTIVE | Noted: 2019-05-09

## 2022-03-19 PROBLEM — E66.9 CLASS 1 OBESITY WITH SERIOUS COMORBIDITY AND BODY MASS INDEX (BMI) OF 33.0 TO 33.9 IN ADULT: Status: ACTIVE | Noted: 2018-04-10

## 2022-03-20 PROBLEM — R73.9 HYPERGLYCEMIA: Status: ACTIVE | Noted: 2020-05-12

## 2022-07-13 DIAGNOSIS — I10 ESSENTIAL HYPERTENSION: ICD-10-CM

## 2022-07-13 RX ORDER — AMLODIPINE BESYLATE 10 MG/1
10 TABLET ORAL DAILY
Qty: 7 TABLET | Refills: 0 | Status: SHIPPED | OUTPATIENT
Start: 2022-07-13 | End: 2022-07-18

## 2022-07-13 RX ORDER — TRIAMTERENE/HYDROCHLOROTHIAZID 37.5-25 MG
1 TABLET ORAL DAILY
Qty: 7 TABLET | Refills: 0 | Status: SHIPPED | OUTPATIENT
Start: 2022-07-13 | End: 2022-07-18

## 2022-07-13 NOTE — TELEPHONE ENCOUNTER
Patient has appointment, but it is noted that patient misses appointments after refills. Preference on fill? Teed up for 7 days to tide patient over if provider prefers.

## 2022-07-13 NOTE — TELEPHONE ENCOUNTER
----- Message from Marisela Sneed sent at 7/13/2022  3:17 PM EDT -----  Subject: Message to Provider    QUESTIONS  Information for Provider? patient has a scheduled appt on [email or web address]   and would like a refill on. ... triamterene-hydroCHLOROthiazide (MAXZIDE)   37.5-25 mg per tablet, and amLODIPine (NORVASC) 10 mg tablet, patient has   1 day left for each script and would like both called in if possible by   07/14/22.   ---------------------------------------------------------------------------  --------------  Riley Portillo INFO  [phone]; OK to leave message on [facility], [place] to respond with   electronic message via [employer] portal (only for patients who have   registered [employer] account)  ---------------------------------------------------------------------------  --------------  SCRIPT ANSWERS  Relationship to Patient?  Self

## 2022-07-18 DIAGNOSIS — I10 ESSENTIAL HYPERTENSION: ICD-10-CM

## 2022-07-18 RX ORDER — TRIAMTERENE/HYDROCHLOROTHIAZID 37.5-25 MG
1 TABLET ORAL DAILY
Qty: 7 TABLET | Refills: 0 | Status: SHIPPED | OUTPATIENT
Start: 2022-07-18 | End: 2022-07-22

## 2022-07-18 RX ORDER — AMLODIPINE BESYLATE 10 MG/1
10 TABLET ORAL DAILY
Qty: 7 TABLET | Refills: 0 | Status: SHIPPED | OUTPATIENT
Start: 2022-07-18 | End: 2022-07-22

## 2022-07-20 ENCOUNTER — OFFICE VISIT (OUTPATIENT)
Dept: INTERNAL MEDICINE CLINIC | Age: 57
End: 2022-07-20
Payer: COMMERCIAL

## 2022-07-20 VITALS
RESPIRATION RATE: 12 BRPM | HEART RATE: 83 BPM | HEIGHT: 72 IN | DIASTOLIC BLOOD PRESSURE: 94 MMHG | TEMPERATURE: 98.2 F | BODY MASS INDEX: 33.72 KG/M2 | SYSTOLIC BLOOD PRESSURE: 160 MMHG | WEIGHT: 249 LBS | OXYGEN SATURATION: 96 %

## 2022-07-20 DIAGNOSIS — E55.9 VITAMIN D DEFICIENCY: ICD-10-CM

## 2022-07-20 DIAGNOSIS — I10 ESSENTIAL HYPERTENSION: Primary | ICD-10-CM

## 2022-07-20 DIAGNOSIS — E66.9 OBESITY (BMI 30.0-34.9): ICD-10-CM

## 2022-07-20 DIAGNOSIS — E78.00 PURE HYPERCHOLESTEROLEMIA: ICD-10-CM

## 2022-07-20 DIAGNOSIS — R73.9 HYPERGLYCEMIA: ICD-10-CM

## 2022-07-20 PROBLEM — E66.811 OBESITY (BMI 30.0-34.9): Status: ACTIVE | Noted: 2018-04-10

## 2022-07-20 PROCEDURE — 99214 OFFICE O/P EST MOD 30 MIN: CPT | Performed by: INTERNAL MEDICINE

## 2022-07-20 RX ORDER — HYDROCHLOROTHIAZIDE 25 MG/1
25 TABLET ORAL DAILY
COMMUNITY
Start: 2022-05-16 | End: 2022-07-21 | Stop reason: SDUPTHER

## 2022-07-20 NOTE — PROGRESS NOTES
Health Maintenance Due   Topic Date Due    Hepatitis C Screening  Never done    Depression Screen  Never done    COVID-19 Vaccine (1) Never done    Pneumococcal 0-64 years (1 - PCV) Never done    DTaP/Tdap/Td series (1 - Tdap) Never done    Colorectal Cancer Screening Combo  Never done    Shingrix Vaccine Age 50> (1 of 2) Never done    Breast Cancer Screen Mammogram  05/02/2015       No chief complaint on file. 1. Have you been to the ER, urgent care clinic since your last visit? Hospitalized since your last visit? Yes 3 months ago for HTN    2. Have you seen or consulted any other health care providers outside of the [address] [place] [address] since your last visit? Include any pap smears or colon screening. Idalia Dove    3) Do you have an Advance Directive on file? no    4) Are you interested in receiving information on Advance Directives? NO      Patient is accompanied by self I have received verbal consent from Petra Sinha to discuss any/all medical information while they are present in the room.

## 2022-07-21 DIAGNOSIS — I10 ESSENTIAL HYPERTENSION: ICD-10-CM

## 2022-07-21 LAB
25(OH)D3+25(OH)D2 SERPL-MCNC: 10 NG/ML (ref 30–100)
ALBUMIN SERPL-MCNC: 4.6 G/DL (ref 3.8–4.9)
ALBUMIN/GLOB SERPL: 1.3 {RATIO} (ref 1.2–2.2)
ALP SERPL-CCNC: 79 IU/L (ref 44–121)
ALT SERPL-CCNC: 24 IU/L (ref 0–32)
AST SERPL-CCNC: 33 IU/L (ref 0–40)
BILIRUB SERPL-MCNC: 0.4 MG/DL (ref 0–1.2)
BUN SERPL-MCNC: 12 MG/DL (ref 6–24)
BUN/CREAT SERPL: 15 (ref 9–23)
CALCIUM SERPL-MCNC: 10 MG/DL (ref 8.7–10.2)
CHLORIDE SERPL-SCNC: 99 MMOL/L (ref 96–106)
CHOLEST SERPL-MCNC: 227 MG/DL (ref 100–199)
CO2 SERPL-SCNC: 25 MMOL/L (ref 20–29)
CREAT SERPL-MCNC: 0.78 MG/DL (ref 0.57–1)
EGFR: 89 ML/MIN/1.73
ERYTHROCYTE [DISTWIDTH] IN BLOOD BY AUTOMATED COUNT: 13.3 % (ref 11.7–15.4)
EST. AVERAGE GLUCOSE BLD GHB EST-MCNC: 114 MG/DL
GLOBULIN SER CALC-MCNC: 3.5 G/DL (ref 1.5–4.5)
GLUCOSE SERPL-MCNC: 81 MG/DL (ref 65–99)
HBA1C MFR BLD: 5.6 % (ref 4.8–5.6)
HCT VFR BLD AUTO: 44.5 % (ref 34–46.6)
HDLC SERPL-MCNC: 36 MG/DL
HGB BLD-MCNC: 14.3 G/DL (ref 11.1–15.9)
IMP & REVIEW OF LAB RESULTS: NORMAL
LDLC SERPL CALC-MCNC: 160 MG/DL (ref 0–99)
MCH RBC QN AUTO: 27.3 PG (ref 26.6–33)
MCHC RBC AUTO-ENTMCNC: 32.1 G/DL (ref 31.5–35.7)
MCV RBC AUTO: 85 FL (ref 79–97)
PLATELET # BLD AUTO: 299 X10E3/UL (ref 150–450)
POTASSIUM SERPL-SCNC: 3.2 MMOL/L (ref 3.5–5.2)
PROT SERPL-MCNC: 8.1 G/DL (ref 6–8.5)
RBC # BLD AUTO: 5.24 X10E6/UL (ref 3.77–5.28)
SODIUM SERPL-SCNC: 140 MMOL/L (ref 134–144)
TRIGL SERPL-MCNC: 167 MG/DL (ref 0–149)
TSH SERPL DL<=0.005 MIU/L-ACNC: 1.83 UIU/ML (ref 0.45–4.5)
VLDLC SERPL CALC-MCNC: 31 MG/DL (ref 5–40)
WBC # BLD AUTO: 5.3 X10E3/UL (ref 3.4–10.8)

## 2022-07-21 NOTE — TELEPHONE ENCOUNTER
Requested Prescriptions     Pending Prescriptions Disp Refills    albuterol (PROVENTIL HFA, VENTOLIN HFA, PROAIR HFA) 90 mcg/actuation inhaler 1 Each 1     Sig: Take 2 Puffs by inhalation every six (6) hours as needed for Wheezing. amLODIPine (NORVASC) 10 mg tablet 7 Tablet 0     Sig: Take 1 Tablet by mouth in the morning. azelastine (ASTELIN) 137 mcg (0.1 %) nasal spray 1 Each 0     [place] by Both Nostrils route two (2) times a day. Use in each nostril as directed    hydroCHLOROthiazide (HYDRODIURIL) 25 mg tablet       Sig: Take 1 Tablet by mouth in the morning. triamterene-hydroCHLOROthiazide (MAXZIDE) 37.5-25 mg per tablet 7 Tablet 0     Sig: Take 1 Tablet by mouth in the morning. potassium chloride (K-DUR, KLOR-CON M20) 20 mEq tablet 180 Tablet 1     Sig: TAKE 1 TABLET BY MOUTH TWICE DAILY     Patient thought Dilan Pascal had already sent these. She said she told him she needed refills.  Patient also said a script for vitamin d was supposed to be sent in as well  2022

## 2022-07-22 RX ORDER — HYDROCHLOROTHIAZIDE 25 MG/1
25 TABLET ORAL DAILY
Qty: 90 TABLET | Refills: 1 | Status: SHIPPED | OUTPATIENT
Start: 2022-07-22 | End: 2022-07-26

## 2022-07-22 RX ORDER — TRIAMTERENE/HYDROCHLOROTHIAZID 37.5-25 MG
1 TABLET ORAL DAILY
Qty: 7 TABLET | Refills: 0 | Status: SHIPPED | OUTPATIENT
Start: 2022-07-22 | End: 2022-07-26 | Stop reason: SDUPTHER

## 2022-07-22 RX ORDER — ALBUTEROL SULFATE 90 UG/1
2 AEROSOL, METERED RESPIRATORY (INHALATION)
Qty: 1 EACH | Refills: 1 | Status: SHIPPED | OUTPATIENT
Start: 2022-07-22

## 2022-07-22 RX ORDER — POTASSIUM CHLORIDE 20 MEQ/1
TABLET, EXTENDED RELEASE ORAL
Qty: 180 TABLET | Refills: 1 | Status: SHIPPED | OUTPATIENT
Start: 2022-07-22

## 2022-07-22 RX ORDER — AZELASTINE 1 MG/ML
1 SPRAY, METERED NASAL 2 TIMES DAILY
Qty: 1 EACH | Refills: 0 | Status: SHIPPED | OUTPATIENT
Start: 2022-07-22

## 2022-07-22 RX ORDER — AMLODIPINE BESYLATE 10 MG/1
10 TABLET ORAL DAILY
Qty: 7 TABLET | Refills: 0 | Status: SHIPPED | OUTPATIENT
Start: 2022-07-22 | End: 2022-07-26 | Stop reason: SDUPTHER

## 2022-07-26 DIAGNOSIS — I10 ESSENTIAL HYPERTENSION: ICD-10-CM

## 2022-07-26 RX ORDER — TRIAMTERENE/HYDROCHLOROTHIAZID 37.5-25 MG
1 TABLET ORAL DAILY
Qty: 7 TABLET | Refills: 0 | Status: SHIPPED | OUTPATIENT
Start: 2022-07-26

## 2022-07-26 RX ORDER — AMLODIPINE BESYLATE 10 MG/1
10 TABLET ORAL DAILY
Qty: 7 TABLET | Refills: 0 | Status: SHIPPED | OUTPATIENT
Start: 2022-07-26

## 2022-07-26 NOTE — TELEPHONE ENCOUNTER
Requested Prescriptions     Pending Prescriptions Disp Refills    amLODIPine (NORVASC) 10 mg tablet 7 Tablet 0     Sig: Take 1 Tablet by mouth in the morning. triamterene-hydroCHLOROthiazide (MAXZIDE) 37.5-25 mg per tablet 7 Tablet 0     Sig: Take 1 Tablet by mouth in the morning.          [id number] [address] [id number]  Phone: [phone] Fax: [phone]       7/20/2022 is LAST OFFICE VISIT     Future Appointments   Date Time Provider Jenny Jenkins   8/3/2022  1:45 PM DO GISELA Bernstein BS [facility]

## 2022-07-26 NOTE — TELEPHONE ENCOUNTER
Pt only prescribed 7 amlodipine and 7 triamterene- hydrochlorothiazide to hold her until her appointment on 7/20/22    Pt needs the rest of her refill on these  Lov: 7/20/22  Nov: 8/3/22

## 2022-07-29 RX ORDER — ERGOCALCIFEROL 1.25 MG/1
50000 CAPSULE ORAL
Qty: 4 CAPSULE | Refills: 2 | Status: SHIPPED | OUTPATIENT
Start: 2022-07-29 | End: 2022-10-15

## 2022-07-29 NOTE — PROGRESS NOTES
Potassium is low, 3.2. What is patient's current potassium dose? Cholesterol levels are elevated. Patient previously recommended to start Lipitor, per chart review. May start Lipitor 10 mg po daily if patient agreeable. Repeat lipid panel and CMP in 6-8 weeks if new medication started. Recommend that patient watch diet for fatty foods and exercise as tolerated. Vitamin D is low. Vitamin D 50,000 units weekly x 12 weeks. After completion of 12 weeks, recommend OTC Vitamin D3 1000 units daily.

## 2022-07-31 NOTE — PROGRESS NOTES
HISTORY OF PRESENT ILLNESS  Carline Rangel is a 62 y.o. female. Pt. comes in for f/u. Has a few chronic medical issues as documented. BP is high. She is on amlodipine and Maxide. Denies any related issues. BMI is 32.85. All chronic medical issues are stable on current treatment regimen. Has had Covid-19 vaccination. Reports taking proper precautions. Denies any related signs or symptoms. PMH/PSH/Allergies/Social History/medication list and most recent studies reviewed with patient. Tobacco use: No  Alcohol use: Social    Reports compliance with medications and diet. Has not been very active physically to control weight. Reports no other new c/o. HPI    Review of Systems   Constitutional: Negative. HENT: Negative. Eyes:  Negative for blurred vision. Respiratory:  Negative for shortness of breath. Cardiovascular:  Negative for chest pain and leg swelling. Gastrointestinal:  Negative for abdominal pain. Genitourinary:  Negative for dysuria. Musculoskeletal:  Negative for back pain, falls and joint pain. Skin: Negative. Neurological:  Negative for dizziness, sensory change, focal weakness and headaches. Psychiatric/Behavioral:  Negative for depression. The patient is not nervous/anxious and does not have insomnia. All other systems reviewed and are negative. Physical Exam  Vitals and nursing note reviewed. Constitutional:       General: She is not in acute distress. Appearance: She is well-developed. She is obese. Comments: obese   HENT:      Head: Normocephalic and atraumatic. Mouth/Throat:      Mouth: Mucous membranes are moist.      Pharynx: Oropharynx is clear. Eyes:      General: No scleral icterus. Conjunctiva/sclera: Conjunctivae normal.   Neck:      Thyroid: No thyromegaly. Vascular: No carotid bruit or JVD. Cardiovascular:      Rate and Rhythm: Normal rate and regular rhythm. Heart sounds: Normal heart sounds.  No murmur heard.  Pulmonary:      Effort: Pulmonary effort is normal. No respiratory distress. Breath sounds: Normal breath sounds. No wheezing or rales. Abdominal:      General: Bowel sounds are normal. There is no distension. Palpations: Abdomen is soft. Tenderness: There is no right CVA tenderness or left CVA tenderness. Comments: Obese   Musculoskeletal:         General: No tenderness. Cervical back: Normal range of motion and neck supple. Right lower leg: No edema. Left lower leg: No edema. Comments: R shoulder lipoma   Skin:     General: Skin is warm and dry. Findings: No rash. Neurological:      Mental Status: She is alert and oriented to person, place, and time. Coordination: Coordination normal.      Gait: Gait normal.   Psychiatric:         Behavior: Behavior normal.       ASSESSMENT and PLAN  Diagnoses and all orders for this visit:    1. Essential hypertension  -     LIPID PANEL; Future  -     METABOLIC PANEL, COMPREHENSIVE; Future  -     CBC W/O DIFF; Future  -     HEMOGLOBIN A1C WITH EAG; Future  -     TSH 3RD GENERATION; Future    2. Obesity (BMI 30.0-34. 9)  Advised patient to lose weight by watching diet (decreasing sugars/carbs/fat, increasing fruits/vegetables), exercising at least 30 minutes daily, getting 7-8 hours of sleep daily, drinking plenty of water, and decreasing stress  3. Pure hypercholesterolemia  -     LIPID PANEL; Future  -     METABOLIC PANEL, COMPREHENSIVE; Future  -     CBC W/O DIFF; Future  -     HEMOGLOBIN A1C WITH EAG; Future  -     TSH 3RD GENERATION; Future    4. Hyperglycemia  -     LIPID PANEL; Future  -     METABOLIC PANEL, COMPREHENSIVE; Future  -     CBC W/O DIFF; Future  -     HEMOGLOBIN A1C WITH EAG; Future  -     TSH 3RD GENERATION; Future    5.  Vitamin D deficiency  -     VITAMIN D, 25 HYDROXY; Future    Other orders  -     METABOLIC PANEL, COMPREHENSIVE  -     CBC W/O DIFF  -     LIPID PANEL  -     HEMOGLOBIN A1C WITH EAG  -     TSH 3RD GENERATION  -     VITAMIN D, 25 HYDROXY  -     CVD REPORT      Follow-up and Dispositions    Return in about 2 weeks (around 8/3/2022). Continue with current medical management and plan  lab results and schedule of future lab studies reviewed with patient  reviewed diet, exercise and weight control  reviewed medications and side effects in detail  F/u with other MD's/ providers as scheduled  COVID-19 precautions discussed with pt  An After Visit Summary was printed and given to the patient.

## 2022-08-02 ENCOUNTER — TELEPHONE (OUTPATIENT)
Dept: INTERNAL MEDICINE CLINIC | Age: 57
End: 2022-08-02

## 2022-08-02 NOTE — TELEPHONE ENCOUNTER
Call placed to pt to discuss lab results, Pt states she has an appt with provider tomorrow and would like to discuss then.  I advised ok and and we will see her then

## 2022-08-02 NOTE — TELEPHONE ENCOUNTER
----- Message from Khris Mueller NP sent at 7/29/2022  7:09 PM EDT -----  Potassium is low, 3.2. What is patient's current potassium dose? Cholesterol levels are elevated. Patient previously recommended to start Lipitor, per chart review. May start Lipitor 10 mg po daily if patient agreeable. Repeat lipid panel and CMP in 6-8 weeks if new medication started. Recommend that patient watch diet for fatty foods and exercise as tolerated. Vitamin D is low. Vitamin D 50,000 units weekly x 12 weeks. After completion of 12 weeks, recommend OTC Vitamin D3 1000 units daily.

## 2022-08-03 ENCOUNTER — OFFICE VISIT (OUTPATIENT)
Dept: INTERNAL MEDICINE CLINIC | Age: 57
End: 2022-08-03
Payer: COMMERCIAL

## 2022-08-03 VITALS
RESPIRATION RATE: 16 BRPM | HEIGHT: 72 IN | DIASTOLIC BLOOD PRESSURE: 80 MMHG | SYSTOLIC BLOOD PRESSURE: 142 MMHG | WEIGHT: 247 LBS | HEART RATE: 82 BPM | OXYGEN SATURATION: 99 % | BODY MASS INDEX: 33.46 KG/M2 | TEMPERATURE: 98.2 F

## 2022-08-03 DIAGNOSIS — E55.9 VITAMIN D DEFICIENCY: ICD-10-CM

## 2022-08-03 DIAGNOSIS — I10 ESSENTIAL HYPERTENSION: Primary | ICD-10-CM

## 2022-08-03 DIAGNOSIS — E87.6 HYPOKALEMIA: ICD-10-CM

## 2022-08-03 DIAGNOSIS — E66.9 OBESITY (BMI 30.0-34.9): ICD-10-CM

## 2022-08-03 DIAGNOSIS — E78.00 PURE HYPERCHOLESTEROLEMIA: ICD-10-CM

## 2022-08-03 PROCEDURE — 99214 OFFICE O/P EST MOD 30 MIN: CPT | Performed by: INTERNAL MEDICINE

## 2022-08-03 RX ORDER — ATORVASTATIN CALCIUM 10 MG/1
10 TABLET, FILM COATED ORAL
Qty: 90 TABLET | Refills: 1 | Status: SHIPPED | OUTPATIENT
Start: 2022-08-03

## 2022-08-03 RX ORDER — LOSARTAN POTASSIUM 50 MG/1
50 TABLET ORAL DAILY
Qty: 90 TABLET | Refills: 1 | Status: SHIPPED | OUTPATIENT
Start: 2022-08-03

## 2022-08-03 NOTE — PROGRESS NOTES
Health Maintenance Due   Topic Date Due    Hepatitis C Screening  Never done    COVID-19 Vaccine (1) Never done    Pneumococcal 0-64 years (1 - PCV) Never done    DTaP/Tdap/Td series (1 - Tdap) Never done    Colorectal Cancer Screening Combo  Never done    Shingrix Vaccine Age 50> (1 of 2) Never done    Breast Cancer Screen Mammogram  05/02/2015       Chief Complaint   Patient presents with    Hypertension    Knee Pain    Other     2 week follow up       1. Have you been to the ER, urgent care clinic since your last visit? Hospitalized since your last visit? YES, FEB 2022, [facility], [facility]    2. Have you seen or consulted any other health care providers outside of the [address] [place] [address] since your last visit? Include any pap smears or colon screening. No    3) Do you have an Advance Directive on file? no    4) Are you interested in receiving information on Advance Directives? NO      Patient is accompanied by self I have received verbal consent from Carline Rangel to discuss any/all medical information while they are present in the room.

## 2022-09-01 NOTE — PROGRESS NOTES
HISTORY OF PRESENT ILLNESS  Sachin Dale is a 62 y.o. female. Pt. comes in for f/u. Has a few chronic medical issues as documented including HTN, HLD, obesity, vitamin D deficiency, hypokalemia. BP looks much better than last time. Has lost couple pounds. BMI 32.6. All other chronic medical issues are stable on current treatment regimen. Has had Covid-19 vaccination. Reports taking proper precautions. Denies any related signs or symptoms. PMH/PSH/Allergies/Social History/medication list and most recent studies reviewed with patient. Significant for low potassium, high lipids, low vitamin D. Tobacco use: No  Alcohol use: Social    Reports compliance with medications and diet. Trying to be active physically to control weight. Reports no other new c/o. HPI    Review of Systems   Constitutional: Negative. HENT: Negative. Eyes:  Negative for blurred vision. Respiratory:  Negative for shortness of breath. Cardiovascular:  Negative for chest pain and leg swelling. Gastrointestinal:  Negative for abdominal pain. Genitourinary:  Negative for dysuria. Musculoskeletal:  Negative for back pain, falls and joint pain. Skin: Negative. Neurological:  Negative for dizziness, sensory change, focal weakness and headaches. Psychiatric/Behavioral:  Negative for depression. The patient is not nervous/anxious and does not have insomnia. All other systems reviewed and are negative. Physical Exam  Vitals and nursing note reviewed. Constitutional:       General: She is not in acute distress. Appearance: She is well-developed. She is obese. Comments: Pleasant lady   HENT:      Head: Normocephalic and atraumatic. Mouth/Throat:      Mouth: Mucous membranes are moist.      Pharynx: Oropharynx is clear. Eyes:      General: No scleral icterus. Conjunctiva/sclera: Conjunctivae normal.   Neck:      Thyroid: No thyromegaly. Vascular: No carotid bruit or JVD. Cardiovascular:      Rate and Rhythm: Normal rate and regular rhythm. Heart sounds: Normal heart sounds. No murmur heard. Pulmonary:      Effort: Pulmonary effort is normal. No respiratory distress. Breath sounds: Normal breath sounds. No wheezing or rales. Abdominal:      General: Bowel sounds are normal. There is no distension. Palpations: Abdomen is soft. Tenderness: There is no right CVA tenderness or left CVA tenderness. Comments: Obese   Musculoskeletal:         General: No tenderness. Cervical back: Normal range of motion and neck supple. Right lower leg: No edema. Left lower leg: No edema. Comments: R shoulder lipoma   Skin:     General: Skin is warm and dry. Findings: No rash. Neurological:      Mental Status: She is alert and oriented to person, place, and time. Coordination: Coordination normal.      Gait: Gait normal.   Psychiatric:         Behavior: Behavior normal.       ASSESSMENT and PLAN  Diagnoses and all orders for this visit:    1. Essential hypertension  -     METABOLIC PANEL, BASIC; Future  Monitor BP at home with goal of 140/90 or less  Discussed do's and don't's of HTN  2. Pure hypercholesterolemia  -     LIPID PANEL; Future  -     ALT; Future  -     AST; Future  -     METABOLIC PANEL, BASIC; Future    3. Obesity (BMI 30.0-34. 9)  Advised patient to lose weight by watching diet (decreasing sugars/carbs/fat, increasing fruits/vegetables), exercising at least 30 minutes daily, getting 7-8 hours of sleep daily, drinking plenty of water, and decreasing stress    4. Vitamin D deficiency  -     VITAMIN D, 25 HYDROXY; Future  Continue vitamin supplement  5. Hypokalemia  Continue potassium supplements  Other orders  -     losartan (COZAAR) 50 mg tablet; Take 1 Tablet by mouth in the morning.  -     atorvastatin (LIPITOR) 10 mg tablet; Take 1 Tablet by mouth nightly.       Follow-up and Dispositions    Return in about 2 months (around 10/3/2022). All chronic medical problems are stable  Continue with current medical management and plan  lab results and schedule of future lab studies reviewed with patient  reviewed diet, exercise and weight control  reviewed medications and side effects in detail  F/u with other MD's/ providers as scheduled  COVID-19 precautions discussed with pt  An After Visit Summary was printed and given to the patient.

## 2022-10-20 ENCOUNTER — IMMUNIZATION (OUTPATIENT)
Dept: PHARMACY | Age: 57
End: 2022-10-20

## 2023-04-14 PROBLEM — E55.9 VITAMIN D DEFICIENCY: Status: ACTIVE | Noted: 2023-04-14

## 2023-04-14 PROBLEM — E87.6 HYPOKALEMIA: Status: ACTIVE | Noted: 2023-04-14

## 2023-05-10 DIAGNOSIS — E78.00 PURE HYPERCHOLESTEROLEMIA: ICD-10-CM

## 2023-05-10 DIAGNOSIS — E55.9 VITAMIN D DEFICIENCY: Primary | ICD-10-CM

## 2023-05-10 RX ORDER — ERGOCALCIFEROL 1.25 MG/1
50000 CAPSULE ORAL WEEKLY
Qty: 12 CAPSULE | Refills: 0 | Status: SHIPPED | OUTPATIENT
Start: 2023-05-10

## 2023-05-10 RX ORDER — ATORVASTATIN CALCIUM 20 MG/1
20 TABLET, FILM COATED ORAL DAILY
Qty: 90 TABLET | Refills: 1 | Status: SHIPPED | OUTPATIENT
Start: 2023-05-10

## 2023-05-10 NOTE — TELEPHONE ENCOUNTER
Call placed to patient, no answer. Left VM for patient to r/c to go over labs. Message  Received: Derek Almaguer, LIDIA Jaramillo  Cholesterol is too high, taking atorvastatin regularly? If so, is she agreeable to increasing to 20 mg? Also vitamin D is very deficient, needs prescription vitamin D x 12 weeks, if agreeable, I will send these medications in. Also remind her to try to avoid greasy foods and to get 150 minutes of purposeful exercise per week.

## 2023-05-12 ENCOUNTER — TELEPHONE (OUTPATIENT)
Age: 58
End: 2023-05-12

## 2023-08-03 ENCOUNTER — TELEPHONE (OUTPATIENT)
Age: 58
End: 2023-08-03

## 2023-08-03 DIAGNOSIS — Z12.11 ENCOUNTER FOR SCREENING FOR MALIGNANT NEOPLASM OF COLON: Primary | ICD-10-CM

## 2023-08-03 NOTE — TELEPHONE ENCOUNTER
Called patient, informed her the results, which she already knew, patient became upset, advised her we were going to take this one step at a time, and start with a colonoscopy to make sure there is nothing to be concerned of. Informed her sometimes these things are positive and the colonoscopies are fine, patient advised to call us if anything changes or if she needs anything from us in the meantime.

## 2023-10-24 ENCOUNTER — HOSPITAL ENCOUNTER (EMERGENCY)
Facility: HOSPITAL | Age: 58
Discharge: HOME | End: 2023-10-24
Attending: EMERGENCY MEDICINE
Payer: COMMERCIAL

## 2023-10-24 ENCOUNTER — APPOINTMENT (OUTPATIENT)
Facility: HOSPITAL | Age: 58
End: 2023-10-24
Payer: COMMERCIAL

## 2023-10-24 VITALS
WEIGHT: 250 LBS | HEART RATE: 74 BPM | OXYGEN SATURATION: 100 % | HEIGHT: 72 IN | TEMPERATURE: 98.7 F | RESPIRATION RATE: 16 BRPM | BODY MASS INDEX: 33.86 KG/M2 | DIASTOLIC BLOOD PRESSURE: 93 MMHG | SYSTOLIC BLOOD PRESSURE: 165 MMHG

## 2023-10-24 DIAGNOSIS — S93.401A SPRAIN OF RIGHT ANKLE, UNSPECIFIED LIGAMENT, INITIAL ENCOUNTER: Primary | ICD-10-CM

## 2023-10-24 PROCEDURE — 73630 X-RAY EXAM OF FOOT: CPT

## 2023-10-24 PROCEDURE — 99283 EMERGENCY DEPT VISIT LOW MDM: CPT

## 2023-10-24 ASSESSMENT — PAIN DESCRIPTION - PAIN TYPE: TYPE: ACUTE PAIN

## 2023-10-24 ASSESSMENT — PAIN SCALES - GENERAL: PAINLEVEL_OUTOF10: 9

## 2023-10-24 ASSESSMENT — PAIN DESCRIPTION - DESCRIPTORS: DESCRIPTORS: ACHING

## 2023-10-24 ASSESSMENT — PAIN DESCRIPTION - ORIENTATION: ORIENTATION: RIGHT

## 2023-10-24 ASSESSMENT — LIFESTYLE VARIABLES
HOW MANY STANDARD DRINKS CONTAINING ALCOHOL DO YOU HAVE ON A TYPICAL DAY: PATIENT DOES NOT DRINK
HOW OFTEN DO YOU HAVE A DRINK CONTAINING ALCOHOL: NEVER

## 2023-10-24 ASSESSMENT — PAIN DESCRIPTION - LOCATION: LOCATION: ANKLE

## 2023-10-24 ASSESSMENT — PAIN - FUNCTIONAL ASSESSMENT: PAIN_FUNCTIONAL_ASSESSMENT: 0-10

## 2023-10-24 NOTE — ED PROVIDER NOTES
[facility] EMERGENCY DEPT  EMERGENCY DEPARTMENT ENCOUNTER      Pt Name: Nayeli Shaffer  MRN: [id number]  [address] [place] 1965  Date of evaluation: 10/24/2023  Provider: Denys Carlos MD    [address]       Chief Complaint   Patient presents with    Ankle Pain     Right         HISTORY OF PRESENT ILLNESS   (Location/Symptom, Timing/Onset, Context/Setting, Quality, Duration, Modifying Factors, Severity)  Note limiting factors. 51-year-old female with a history of hyper tension presenting to the emergency department with 1 day history of right ankle pain. Patient states that she she was walking outside and heard a dog barking so she turned around to see whether the dog might be at, and then subsequently resumed walking but accidentally stepped into recessed manhole. Manhole closed, but 2-3 inches below surface. Twisted ankle. Had pain. Able to bear weight. No prior ankle history of trauma. Review of External Medical Records:     Nursing Notes were reviewed. REVIEW OF SYSTEMS    (2-9 systems for level 4, 10 or more for level 5)     Review of Systems   Musculoskeletal:  Positive for arthralgias. Negative for joint swelling. Skin:  Negative for rash and wound. Neurological:  Negative for dizziness and light-headedness. Except as noted above the remainder of the review of systems was reviewed and negative.        PAST MEDICAL HISTORY     Past Medical History:   Diagnosis Date    Arthritis     Follicular adenoma of thyroid gland 12/5/2012    Hypercholesterolemia     Hypertension     Lipoma of right upper extremity 5/9/2019    Migraine          SURGICAL HISTORY       Past Surgical History:   Procedure Laterality Date    GYN      hysterectomy    TUMOR REMOVAL           CURRENT MEDICATIONS       Previous Medications    ALBUTEROL SULFATE HFA (PROVENTIL;VENTOLIN;PROAIR) 108 (90 BASE) MCG/ACT INHALER    Inhale 2 puffs into the lungs every 6 hours as needed    AMLODIPINE (NORVASC) 10 MG

## 2023-10-24 NOTE — ED TRIAGE NOTES
Patient arrives c/o rolling right ankle. Denies any meds prior to arrival. Ambulatory. Hx of HTN has not taken medications yet today.

## 2023-10-24 NOTE — ED NOTES
Pt given discharge instructions, patient education, and follow up information. Pt verbalizes understanding. All questions answered. Pt discharged to home in private vehicle, ambulatory. Pt A&Ox4, RA, pain controlled.       Nile Renner RN  10/24/23 [id number]

## 2024-10-01 ENCOUNTER — HOSPITAL ENCOUNTER (EMERGENCY)
Facility: HOSPITAL | Age: 59
Discharge: HOME | End: 2024-10-01
Attending: EMERGENCY MEDICINE
Payer: COMMERCIAL

## 2024-10-01 VITALS
BODY MASS INDEX: 32.51 KG/M2 | SYSTOLIC BLOOD PRESSURE: 194 MMHG | TEMPERATURE: 98.3 F | HEIGHT: 72 IN | DIASTOLIC BLOOD PRESSURE: 115 MMHG | HEART RATE: 87 BPM | WEIGHT: 240 LBS | RESPIRATION RATE: 18 BRPM | OXYGEN SATURATION: 98 %

## 2024-10-01 DIAGNOSIS — K04.7 DENTAL INFECTION: Primary | ICD-10-CM

## 2024-10-01 PROCEDURE — 99283 EMERGENCY DEPT VISIT LOW MDM: CPT

## 2024-10-01 PROCEDURE — 6370000000 HC RX 637 (ALT 250 FOR IP): Performed by: EMERGENCY MEDICINE

## 2024-10-01 RX ADMIN — AMOXICILLIN AND CLAVULANATE POTASSIUM 1 TABLET: 875; 125 TABLET, FILM COATED ORAL at 04:00

## 2024-10-01 ASSESSMENT — PAIN - FUNCTIONAL ASSESSMENT: PAIN_FUNCTIONAL_ASSESSMENT: NONE - DENIES PAIN

## 2024-10-01 ASSESSMENT — LIFESTYLE VARIABLES
HOW OFTEN DO YOU HAVE A DRINK CONTAINING ALCOHOL: NEVER
HOW MANY STANDARD DRINKS CONTAINING ALCOHOL DO YOU HAVE ON A TYPICAL DAY: PATIENT DOES NOT DRINK

## 2024-10-01 NOTE — ED PROVIDER NOTES
Follicular adenoma of thyroid gland 12/5/2012    Hypercholesterolemia     Hypertension     Lipoma of right upper extremity 5/9/2019    Migraine          SURGICAL HISTORY       Past Surgical History:   Procedure Laterality Date    GYN      hysterectomy    TUMOR REMOVAL           CURRENT MEDICATIONS       Discharge Medication List as of 10/1/2024  4:06 AM        CONTINUE these medications which have NOT CHANGED    Details   vitamin D (ERGOCALCIFEROL) 1.25 MG (02099 UT) CAPS capsule Take 1 capsule by mouth once a week, Disp-12 capsule, R-0Normal      atorvastatin (LIPITOR) 20 MG tablet Take 1 tablet by mouth daily, Disp-90 tablet, R-1D/C 10MG AtorvastatinNormal      albuterol sulfate HFA (PROVENTIL;VENTOLIN;PROAIR) 108 (90 Base) MCG/ACT inhaler Inhale 2 puffs into the lungs every 6 hours as neededHistorical Med      amLODIPine (NORVASC) 10 MG tablet Take 1 tablet by mouth dailyHistorical Med      azelastine (ASTELIN) 0.1 % nasal spray 1 spray by Nasal route 2 times dailyHistorical Med      losartan (COZAAR) 50 MG tablet Take 1 tablet by mouth dailyHistorical Med      potassium chloride (KLOR-CON M) 20 MEQ extended release tablet TAKE 1 TABLET BY MOUTH TWICE DAILYHistorical Med      triamterene-hydroCHLOROthiazide (MAXZIDE-25) 37.5-25 MG per tablet Take 1 tablet by mouth dailyHistorical Med             ALLERGIES     Latex and Shrimp extract    FAMILY HISTORY       Family History   Adopted: Yes          SOCIAL HISTORY       Social History     Socioeconomic History    Marital status: Single     Spouse name: None    Number of children: None    Years of education: None    Highest education level: None   Tobacco Use    Smoking status: Every Day     Current packs/day: 0.25     Types: Cigarettes    Smokeless tobacco: Never   Substance and Sexual Activity    Alcohol use: No     Alcohol/week: 0.0 standard drinks of alcohol    Drug use: Yes     Types: Marijuana (Weed)    Sexual activity: Defer       SCREENINGS         [place]

## 2024-10-01 NOTE — ED TRIAGE NOTES
Pt arrived POV c/o waking up with swelling of the right side of face. Pt had 2 Aleve around 2000. Pt states she has been having dental problems on the right side and has a dentist appointment. Pt denies CP, SOB, N/V/D. Pt hypertensive in triage. Pt has hx of HTN and has not taken BP medication today. Pt uses marijuana daily.